# Patient Record
Sex: MALE | Race: OTHER | NOT HISPANIC OR LATINO | URBAN - METROPOLITAN AREA
[De-identification: names, ages, dates, MRNs, and addresses within clinical notes are randomized per-mention and may not be internally consistent; named-entity substitution may affect disease eponyms.]

---

## 2018-06-19 ENCOUNTER — OUTPATIENT (OUTPATIENT)
Dept: OUTPATIENT SERVICES | Facility: HOSPITAL | Age: 68
LOS: 1 days | End: 2018-06-19
Payer: MEDICARE

## 2018-06-19 VITALS
OXYGEN SATURATION: 96 % | TEMPERATURE: 98 F | DIASTOLIC BLOOD PRESSURE: 82 MMHG | HEIGHT: 65 IN | WEIGHT: 181.22 LBS | SYSTOLIC BLOOD PRESSURE: 144 MMHG | HEART RATE: 58 BPM | RESPIRATION RATE: 14 BRPM

## 2018-06-19 VITALS
OXYGEN SATURATION: 98 % | HEIGHT: 65 IN | DIASTOLIC BLOOD PRESSURE: 82 MMHG | RESPIRATION RATE: 15 BRPM | WEIGHT: 181.22 LBS | HEART RATE: 63 BPM | SYSTOLIC BLOOD PRESSURE: 147 MMHG | TEMPERATURE: 97 F

## 2018-06-19 DIAGNOSIS — N40.0 BENIGN PROSTATIC HYPERPLASIA WITHOUT LOWER URINARY TRACT SYMPTOMS: ICD-10-CM

## 2018-06-19 DIAGNOSIS — Z98.890 OTHER SPECIFIED POSTPROCEDURAL STATES: Chronic | ICD-10-CM

## 2018-06-19 DIAGNOSIS — Z87.19 PERSONAL HISTORY OF OTHER DISEASES OF THE DIGESTIVE SYSTEM: Chronic | ICD-10-CM

## 2018-06-19 DIAGNOSIS — Z92.89 PERSONAL HISTORY OF OTHER MEDICAL TREATMENT: Chronic | ICD-10-CM

## 2018-06-19 DIAGNOSIS — N40.1 BENIGN PROSTATIC HYPERPLASIA WITH LOWER URINARY TRACT SYMPTOMS: ICD-10-CM

## 2018-06-19 DIAGNOSIS — Z01.818 ENCOUNTER FOR OTHER PREPROCEDURAL EXAMINATION: ICD-10-CM

## 2018-06-19 LAB
APPEARANCE UR: CLEAR — SIGNIFICANT CHANGE UP
BACTERIA # UR AUTO: NEGATIVE /HPF — SIGNIFICANT CHANGE UP
BILIRUB UR-MCNC: NEGATIVE — SIGNIFICANT CHANGE UP
COLOR SPEC: YELLOW — SIGNIFICANT CHANGE UP
DIFF PNL FLD: ABNORMAL
EPI CELLS # UR: SIGNIFICANT CHANGE UP
GLUCOSE UR QL: NEGATIVE — SIGNIFICANT CHANGE UP
HCT VFR BLD CALC: 45.2 % — SIGNIFICANT CHANGE UP (ref 39–50)
HGB BLD-MCNC: 15.1 G/DL — SIGNIFICANT CHANGE UP (ref 13–17)
KETONES UR-MCNC: NEGATIVE — SIGNIFICANT CHANGE UP
LEUKOCYTE ESTERASE UR-ACNC: ABNORMAL
MCHC RBC-ENTMCNC: 28.6 PG — SIGNIFICANT CHANGE UP (ref 27–34)
MCHC RBC-ENTMCNC: 33.3 GM/DL — SIGNIFICANT CHANGE UP (ref 32–36)
MCV RBC AUTO: 85.7 FL — SIGNIFICANT CHANGE UP (ref 80–100)
NITRITE UR-MCNC: NEGATIVE — SIGNIFICANT CHANGE UP
PH UR: 6 — SIGNIFICANT CHANGE UP (ref 5–8)
PLATELET # BLD AUTO: 147 K/UL — LOW (ref 150–400)
PROT UR-MCNC: 15
RBC # BLD: 5.28 M/UL — SIGNIFICANT CHANGE UP (ref 4.2–5.8)
RBC # FLD: 14 % — SIGNIFICANT CHANGE UP (ref 10.3–14.5)
RBC CASTS # UR COMP ASSIST: SIGNIFICANT CHANGE UP /HPF (ref 0–4)
SP GR SPEC: 1.01 — SIGNIFICANT CHANGE UP (ref 1.01–1.02)
UROBILINOGEN FLD QL: NEGATIVE — SIGNIFICANT CHANGE UP
WBC # BLD: 5.9 K/UL — SIGNIFICANT CHANGE UP (ref 3.8–10.5)
WBC # FLD AUTO: 5.9 K/UL — SIGNIFICANT CHANGE UP (ref 3.8–10.5)
WBC UR QL: SIGNIFICANT CHANGE UP /HPF (ref 0–5)

## 2018-06-19 PROCEDURE — 36415 COLL VENOUS BLD VENIPUNCTURE: CPT

## 2018-06-19 PROCEDURE — 85027 COMPLETE CBC AUTOMATED: CPT

## 2018-06-19 PROCEDURE — 86901 BLOOD TYPING SEROLOGIC RH(D): CPT

## 2018-06-19 PROCEDURE — 87086 URINE CULTURE/COLONY COUNT: CPT

## 2018-06-19 PROCEDURE — 86900 BLOOD TYPING SEROLOGIC ABO: CPT

## 2018-06-19 PROCEDURE — 81001 URINALYSIS AUTO W/SCOPE: CPT

## 2018-06-19 PROCEDURE — 86850 RBC ANTIBODY SCREEN: CPT

## 2018-06-19 PROCEDURE — 93005 ELECTROCARDIOGRAM TRACING: CPT

## 2018-06-19 PROCEDURE — 93010 ELECTROCARDIOGRAM REPORT: CPT | Mod: NC

## 2018-06-19 PROCEDURE — G0463: CPT

## 2018-06-19 RX ORDER — SODIUM CHLORIDE 9 MG/ML
1000 INJECTION, SOLUTION INTRAVENOUS
Qty: 0 | Refills: 0 | Status: DISCONTINUED | OUTPATIENT
Start: 2018-06-19 | End: 2018-06-19

## 2018-06-19 RX ORDER — SODIUM CHLORIDE 9 MG/ML
1000 INJECTION, SOLUTION INTRAVENOUS
Qty: 0 | Refills: 0 | Status: DISCONTINUED | OUTPATIENT
Start: 2018-07-03 | End: 2018-07-03

## 2018-06-19 NOTE — H&P PST ADULT - FAMILY HISTORY
Father  Still living? No  Family history of diabetes mellitus, Age at diagnosis: Age Unknown  Family history of CHF (congestive heart failure), Age at diagnosis: Age Unknown     Mother  Still living? No  Family history of diabetes mellitus, Age at diagnosis: Age Unknown  Family history of breast cancer, Age at diagnosis: Age Unknown

## 2018-06-19 NOTE — ASU PATIENT PROFILE, ADULT - ABILITY TO HEAR (WITH HEARING AID OR HEARING APPLIANCE IF NORMALLY USED):
bilateral hearing aids-sounds muffled without them/Mildly to Moderately Impaired: difficulty hearing in some environments or speaker may need to increase volume or speak distinctly

## 2018-06-19 NOTE — H&P PST ADULT - PSH
H/O inguinal hernia  left groin, 14 years ago  H/O lithotripsy  15 years ago  History of removal of staples

## 2018-06-19 NOTE — H&P PST ADULT - PMH
Chronic UTI (urinary tract infection)    Dementia due to Parkinson's disease with behavioral disturbance    Dry eyes, bilateral    Kidney calculi  left kidney  Osteoarthritis of spine, unspecified spinal osteoarthritis complication status, unspecified spinal region    Parkinson disease    Prostatic hyperplasia, benign localized    Sleep apnea, unspecified type  no cpap  Traumatic injury of head, initial encounter  Evaluated in ED at [facility].  Received 8 staples to the head.

## 2018-06-19 NOTE — ASU PATIENT PROFILE, ADULT - VISION (WITH CORRECTIVE LENSES IF THE PATIENT USUALLY WEARS THEM):
progressive glasses/Partially impaired: cannot see medication labels or newsprint, but can see obstacles in path, and the surrounding layout; can count fingers at arm's length

## 2018-06-19 NOTE — H&P PST ADULT - PROBLEM SELECTOR PLAN 1
General and local anesthetic options, risks and benefits discussed.  Pre-op tests ordered per protocol.  He will see Dr Mejia for medical clearance on 6/27/18.

## 2018-06-19 NOTE — ASU PATIENT PROFILE, ADULT - PMH
Chronic UTI (urinary tract infection)    Dementia due to Parkinson's disease with behavioral disturbance    Dry eyes, bilateral    Kidney calculi  left kidney  Osteoarthritis of spine, unspecified spinal osteoarthritis complication status, unspecified spinal region    Parkinson disease    Prostatic hyperplasia, benign localized    Sleep apnea, unspecified type  no cpap  Traumatic injury of head, initial encounter  Evaluated in ED at [facility].  Received 8 staples to the head.

## 2018-06-20 LAB
CULTURE RESULTS: SIGNIFICANT CHANGE UP
SPECIMEN SOURCE: SIGNIFICANT CHANGE UP

## 2018-07-02 ENCOUNTER — TRANSCRIPTION ENCOUNTER (OUTPATIENT)
Age: 68
End: 2018-07-02

## 2018-07-03 ENCOUNTER — OUTPATIENT (OUTPATIENT)
Dept: OUTPATIENT SERVICES | Facility: HOSPITAL | Age: 68
LOS: 1 days | End: 2018-07-03
Payer: MEDICARE

## 2018-07-03 DIAGNOSIS — N40.1 BENIGN PROSTATIC HYPERPLASIA WITH LOWER URINARY TRACT SYMPTOMS: ICD-10-CM

## 2018-07-03 DIAGNOSIS — Z87.19 PERSONAL HISTORY OF OTHER DISEASES OF THE DIGESTIVE SYSTEM: Chronic | ICD-10-CM

## 2018-07-03 DIAGNOSIS — R31.0 GROSS HEMATURIA: ICD-10-CM

## 2018-07-03 DIAGNOSIS — Z98.890 OTHER SPECIFIED POSTPROCEDURAL STATES: Chronic | ICD-10-CM

## 2018-07-03 DIAGNOSIS — N39.0 URINARY TRACT INFECTION, SITE NOT SPECIFIED: ICD-10-CM

## 2018-07-03 DIAGNOSIS — N40.0 BENIGN PROSTATIC HYPERPLASIA WITHOUT LOWER URINARY TRACT SYMPTOMS: ICD-10-CM

## 2018-07-03 DIAGNOSIS — Z92.89 PERSONAL HISTORY OF OTHER MEDICAL TREATMENT: Chronic | ICD-10-CM

## 2018-07-03 LAB — ABO RH CONFIRMATION: SIGNIFICANT CHANGE UP

## 2018-07-03 RX ORDER — RASAGILINE 0.5 MG/1
1 TABLET ORAL DAILY
Qty: 0 | Refills: 0 | Status: DISCONTINUED | OUTPATIENT
Start: 2018-07-04 | End: 2018-07-18

## 2018-07-03 RX ORDER — CARBIDOPA AND LEVODOPA 25; 100 MG/1; MG/1
2 TABLET ORAL
Qty: 0 | Refills: 0 | Status: DISCONTINUED | OUTPATIENT
Start: 2018-07-03 | End: 2018-07-18

## 2018-07-03 RX ORDER — SODIUM CHLORIDE 9 MG/ML
1000 INJECTION, SOLUTION INTRAVENOUS
Qty: 0 | Refills: 0 | Status: DISCONTINUED | OUTPATIENT
Start: 2018-07-03 | End: 2018-07-18

## 2018-07-03 RX ORDER — OXYCODONE AND ACETAMINOPHEN 5; 325 MG/1; MG/1
1 TABLET ORAL EVERY 4 HOURS
Qty: 0 | Refills: 0 | Status: DISCONTINUED | OUTPATIENT
Start: 2018-07-03 | End: 2018-07-03

## 2018-07-03 RX ORDER — HYDROMORPHONE HYDROCHLORIDE 2 MG/ML
0.5 INJECTION INTRAMUSCULAR; INTRAVENOUS; SUBCUTANEOUS
Qty: 0 | Refills: 0 | Status: DISCONTINUED | OUTPATIENT
Start: 2018-07-03 | End: 2018-07-03

## 2018-07-03 RX ORDER — SERTRALINE 25 MG/1
50 TABLET, FILM COATED ORAL DAILY
Qty: 0 | Refills: 0 | Status: DISCONTINUED | OUTPATIENT
Start: 2018-07-03 | End: 2018-07-18

## 2018-07-03 RX ORDER — SODIUM CHLORIDE 9 MG/ML
1000 INJECTION, SOLUTION INTRAVENOUS
Qty: 0 | Refills: 0 | Status: DISCONTINUED | OUTPATIENT
Start: 2018-07-03 | End: 2018-07-03

## 2018-07-03 RX ADMIN — OXYCODONE AND ACETAMINOPHEN 1 TABLET(S): 5; 325 TABLET ORAL at 22:45

## 2018-07-03 RX ADMIN — SODIUM CHLORIDE 50 MILLILITER(S): 9 INJECTION, SOLUTION INTRAVENOUS at 07:35

## 2018-07-03 RX ADMIN — OXYCODONE AND ACETAMINOPHEN 1 TABLET(S): 5; 325 TABLET ORAL at 23:22

## 2018-07-03 RX ADMIN — SODIUM CHLORIDE 100 MILLILITER(S): 9 INJECTION, SOLUTION INTRAVENOUS at 14:16

## 2018-07-03 RX ADMIN — CARBIDOPA AND LEVODOPA 2 TABLET(S): 25; 100 TABLET ORAL at 21:28

## 2018-07-03 NOTE — BRIEF OPERATIVE NOTE - PROCEDURE
<<-----Click on this checkbox to enter Procedure JUNE, using button electrode  07/03/2018    Active  EHOCHBER

## 2018-07-04 VITALS
HEART RATE: 49 BPM | TEMPERATURE: 98 F | DIASTOLIC BLOOD PRESSURE: 61 MMHG | SYSTOLIC BLOOD PRESSURE: 107 MMHG | OXYGEN SATURATION: 96 % | RESPIRATION RATE: 14 BRPM | WEIGHT: 191.58 LBS

## 2018-07-04 PROCEDURE — 52601 PROSTATECTOMY (TURP): CPT

## 2018-07-04 RX ADMIN — CARBIDOPA AND LEVODOPA 2 TABLET(S): 25; 100 TABLET ORAL at 00:17

## 2018-07-04 RX ADMIN — SODIUM CHLORIDE 100 MILLILITER(S): 9 INJECTION, SOLUTION INTRAVENOUS at 05:17

## 2018-07-04 RX ADMIN — CARBIDOPA AND LEVODOPA 2 TABLET(S): 25; 100 TABLET ORAL at 05:16

## 2018-07-04 NOTE — PROGRESS NOTE ADULT - SUBJECTIVE AND OBJECTIVE BOX
Patient is a 67y old  Male who presents with a chief complaint of  BPH  HPI: underwent prostate plasma button yesterday    Interval Events:  Patient seen and examined at bedside.    MEDICATIONS:  MEDICATIONS  (STANDING):  carbidopa/levodopa  25/100 2 Tablet(s) Oral four times a day  dextrose 5% + sodium chloride 0.45%. 1000 milliLiter(s) (100 mL/Hr) IV Continuous <Continuous>  rasagiline Tablet 1 milliGRAM(s) Oral daily  sertraline 50 milliGRAM(s) Oral daily    MEDICATIONS  (PRN):  oxyCODONE    5 mG/acetaminophen 325 mG 1 Tablet(s) Oral every 4 hours PRN Severe Pain (7 - 10)      Allergies    iodine containing compounds (Hives; Swelling; Joint Pain)  sulfa drugs (Swelling)    Intolerances        Vital Signs Last 24 Hrs  T(C): 36.5 (04 Jul 2018 05:00), Max: 36.8 (03 Jul 2018 17:20)  T(F): 97.7 (04 Jul 2018 05:00), Max: 98.2 (03 Jul 2018 17:20)  HR: 49 (04 Jul 2018 05:00) (49 - 66)  BP: 107/61 (04 Jul 2018 05:00) (107/61 - 187/77)  BP(mean): --  RR: 14 (04 Jul 2018 05:00) (14 - 16)  SpO2: 96% (04 Jul 2018 05:00) (96% - 98%)      I&O's Detail    03 Jul 2018 07:01  -  04 Jul 2018 07:00  --------------------------------------------------------  IN:    dextrose 5% + sodium chloride 0.45%.: 1200 mL    Instillation: 100 mL    lactated ringers.: 700 mL    Oral Fluid: 1600 mL  Total IN: 3600 mL    OUT:    Indwelling Catheter - Urethral: 2935 mL  Total OUT: 2935 mL    Total NET: 665 mL      04 Jul 2018 07:01  -  04 Jul 2018 11:17  --------------------------------------------------------  IN:    Oral Fluid: 320 mL  Total IN: 320 mL    OUT:  Total OUT: 0 mL    Total NET: 320 mL              LABS:                            Physical Exam    Constitutional: alert NAD    Abdomen: soft, NT/ND    Genitourinary: oglesby light pink, testes benign
Patient is a 67y old  Male who presents with a chief complaint of BPH and recurrent UTIs  HPI: admitted for plasma button underwent earlier today. had hematuria and oglesby cahnged. few clots. new catheter placed. now draining well. no clots. irrigates freely    Interval Events:  Patient seen and examined at bedside.    MEDICATIONS:  MEDICATIONS  (STANDING):  lactated ringers. 1000 milliLiter(s) (100 mL/Hr) IV Continuous <Continuous>    MEDICATIONS  (PRN):  HYDROmorphone  Injectable 0.5 milliGRAM(s) IV Push every 10 minutes PRN Moderate Pain  oxyCODONE    5 mG/acetaminophen 325 mG 1 Tablet(s) Oral every 4 hours PRN Moderate Pain      Allergies    iodine containing compounds (Hives; Swelling; Joint Pain)  sulfa drugs (Swelling)    Intolerances        Vital Signs Last 24 Hrs  T(C): 36.6 (03 Jul 2018 11:50), Max: 36.6 (03 Jul 2018 09:54)  T(F): 97.8 (03 Jul 2018 11:50), Max: 97.9 (03 Jul 2018 09:54)  HR: 60 (03 Jul 2018 11:50) (55 - 61)  BP: 187/77 (03 Jul 2018 11:50) (125/68 - 187/77)  BP(mean): --  RR: 16 (03 Jul 2018 11:50) (16 - 18)  SpO2: 98% (03 Jul 2018 11:50) (97% - 100%)      I&O's Detail    03 Jul 2018 07:01  -  03 Jul 2018 16:27  --------------------------------------------------------  IN:    Instillation: 100 mL    lactated ringers.: 400 mL    Oral Fluid: 730 mL  Total IN: 1230 mL    OUT:    Indwelling Catheter - Urethral: 1125 mL  Total OUT: 1125 mL    Total NET: 105 mL              LABS:                            Physical Exam    Constitutional: alert NAD    Abdomen: soft, benign NO HSM, no bladder distention    Genitourinary: penis OK, testes benign, oglesby draining light pink urine

## 2019-09-22 ENCOUNTER — INPATIENT (INPATIENT)
Facility: HOSPITAL | Age: 69
LOS: 7 days | Discharge: HOSPICE | DRG: 698 | End: 2019-09-30
Attending: HOSPITALIST | Admitting: INTERNAL MEDICINE
Payer: MEDICARE

## 2019-09-22 VITALS
TEMPERATURE: 99 F | DIASTOLIC BLOOD PRESSURE: 79 MMHG | SYSTOLIC BLOOD PRESSURE: 145 MMHG | OXYGEN SATURATION: 99 % | WEIGHT: 156.09 LBS | HEIGHT: 67 IN | RESPIRATION RATE: 18 BRPM | HEART RATE: 74 BPM

## 2019-09-22 DIAGNOSIS — M10.9 GOUT, UNSPECIFIED: ICD-10-CM

## 2019-09-22 DIAGNOSIS — Z87.19 PERSONAL HISTORY OF OTHER DISEASES OF THE DIGESTIVE SYSTEM: Chronic | ICD-10-CM

## 2019-09-22 DIAGNOSIS — L89.152 PRESSURE ULCER OF SACRAL REGION, STAGE 2: ICD-10-CM

## 2019-09-22 DIAGNOSIS — Z98.890 OTHER SPECIFIED POSTPROCEDURAL STATES: Chronic | ICD-10-CM

## 2019-09-22 DIAGNOSIS — G20 PARKINSON'S DISEASE: ICD-10-CM

## 2019-09-22 DIAGNOSIS — T83.511A INFECTION AND INFLAMMATORY REACTION DUE TO INDWELLING URETHRAL CATHETER, INITIAL ENCOUNTER: ICD-10-CM

## 2019-09-22 DIAGNOSIS — N39.0 URINARY TRACT INFECTION, SITE NOT SPECIFIED: ICD-10-CM

## 2019-09-22 DIAGNOSIS — Z00.00 ENCOUNTER FOR GENERAL ADULT MEDICAL EXAMINATION WITHOUT ABNORMAL FINDINGS: ICD-10-CM

## 2019-09-22 DIAGNOSIS — E87.2 ACIDOSIS: ICD-10-CM

## 2019-09-22 DIAGNOSIS — Z92.89 PERSONAL HISTORY OF OTHER MEDICAL TREATMENT: Chronic | ICD-10-CM

## 2019-09-22 DIAGNOSIS — R53.1 WEAKNESS: ICD-10-CM

## 2019-09-22 DIAGNOSIS — R33.9 RETENTION OF URINE, UNSPECIFIED: ICD-10-CM

## 2019-09-22 PROBLEM — G47.30 SLEEP APNEA, UNSPECIFIED: Chronic | Status: ACTIVE | Noted: 2018-06-19

## 2019-09-22 PROBLEM — N20.0 CALCULUS OF KIDNEY: Chronic | Status: ACTIVE | Noted: 2018-06-19

## 2019-09-22 PROBLEM — M47.9 SPONDYLOSIS, UNSPECIFIED: Chronic | Status: ACTIVE | Noted: 2018-06-19

## 2019-09-22 PROBLEM — H04.123 DRY EYE SYNDROME OF BILATERAL LACRIMAL GLANDS: Chronic | Status: ACTIVE | Noted: 2018-06-19

## 2019-09-22 PROBLEM — N40.0 BENIGN PROSTATIC HYPERPLASIA WITHOUT LOWER URINARY TRACT SYMPTOMS: Chronic | Status: ACTIVE | Noted: 2018-06-19

## 2019-09-22 PROBLEM — S09.90XA UNSPECIFIED INJURY OF HEAD, INITIAL ENCOUNTER: Chronic | Status: ACTIVE | Noted: 2018-06-19

## 2019-09-22 LAB
ALBUMIN SERPL ELPH-MCNC: 3 G/DL — LOW (ref 3.3–5)
ALP SERPL-CCNC: 76 U/L — SIGNIFICANT CHANGE UP (ref 40–120)
ALT FLD-CCNC: 29 U/L — SIGNIFICANT CHANGE UP (ref 10–45)
ANION GAP SERPL CALC-SCNC: 9 MMOL/L — SIGNIFICANT CHANGE UP (ref 5–17)
APPEARANCE UR: CLEAR — SIGNIFICANT CHANGE UP
APTT BLD: 28.7 SEC — SIGNIFICANT CHANGE UP (ref 27.5–36.3)
AST SERPL-CCNC: 52 U/L — HIGH (ref 10–40)
BASOPHILS # BLD AUTO: 0.03 K/UL — SIGNIFICANT CHANGE UP (ref 0–0.2)
BASOPHILS NFR BLD AUTO: 0.3 % — SIGNIFICANT CHANGE UP (ref 0–2)
BILIRUB SERPL-MCNC: 0.4 MG/DL — SIGNIFICANT CHANGE UP (ref 0.2–1.2)
BILIRUB UR-MCNC: NEGATIVE — SIGNIFICANT CHANGE UP
BUN SERPL-MCNC: 22 MG/DL — SIGNIFICANT CHANGE UP (ref 7–23)
CALCIUM SERPL-MCNC: 9.1 MG/DL — SIGNIFICANT CHANGE UP (ref 8.4–10.5)
CHLORIDE SERPL-SCNC: 106 MMOL/L — SIGNIFICANT CHANGE UP (ref 96–108)
CO2 SERPL-SCNC: 28 MMOL/L — SIGNIFICANT CHANGE UP (ref 22–31)
COLOR SPEC: YELLOW — SIGNIFICANT CHANGE UP
CREAT SERPL-MCNC: 0.97 MG/DL — SIGNIFICANT CHANGE UP (ref 0.5–1.3)
DIFF PNL FLD: ABNORMAL
EOSINOPHIL # BLD AUTO: 0.01 K/UL — SIGNIFICANT CHANGE UP (ref 0–0.5)
EOSINOPHIL NFR BLD AUTO: 0.1 % — SIGNIFICANT CHANGE UP (ref 0–6)
GLUCOSE SERPL-MCNC: 182 MG/DL — HIGH (ref 70–99)
GLUCOSE UR QL: NEGATIVE — SIGNIFICANT CHANGE UP
HCT VFR BLD CALC: 43.3 % — SIGNIFICANT CHANGE UP (ref 39–50)
HGB BLD-MCNC: 13.8 G/DL — SIGNIFICANT CHANGE UP (ref 13–17)
IMM GRANULOCYTES NFR BLD AUTO: 0.9 % — SIGNIFICANT CHANGE UP (ref 0–1.5)
INR BLD: 1.14 RATIO — SIGNIFICANT CHANGE UP (ref 0.88–1.16)
KETONES UR-MCNC: NEGATIVE — SIGNIFICANT CHANGE UP
LACTATE SERPL-SCNC: 1.8 MMOL/L — SIGNIFICANT CHANGE UP (ref 0.7–2)
LACTATE SERPL-SCNC: 2.8 MMOL/L — HIGH (ref 0.7–2)
LEUKOCYTE ESTERASE UR-ACNC: ABNORMAL
LYMPHOCYTES # BLD AUTO: 0.52 K/UL — LOW (ref 1–3.3)
LYMPHOCYTES # BLD AUTO: 5 % — LOW (ref 13–44)
MCHC RBC-ENTMCNC: 28.3 PG — SIGNIFICANT CHANGE UP (ref 27–34)
MCHC RBC-ENTMCNC: 31.9 GM/DL — LOW (ref 32–36)
MCV RBC AUTO: 88.7 FL — SIGNIFICANT CHANGE UP (ref 80–100)
MONOCYTES # BLD AUTO: 0.17 K/UL — SIGNIFICANT CHANGE UP (ref 0–0.9)
MONOCYTES NFR BLD AUTO: 1.6 % — LOW (ref 2–14)
NEUTROPHILS # BLD AUTO: 9.49 K/UL — HIGH (ref 1.8–7.4)
NEUTROPHILS NFR BLD AUTO: 92.1 % — HIGH (ref 43–77)
NITRITE UR-MCNC: POSITIVE
NRBC # BLD: 0 /100 WBCS — SIGNIFICANT CHANGE UP (ref 0–0)
PH UR: 8 — SIGNIFICANT CHANGE UP (ref 5–8)
PLATELET # BLD AUTO: 208 K/UL — SIGNIFICANT CHANGE UP (ref 150–400)
POTASSIUM SERPL-MCNC: 4.1 MMOL/L — SIGNIFICANT CHANGE UP (ref 3.5–5.3)
POTASSIUM SERPL-SCNC: 4.1 MMOL/L — SIGNIFICANT CHANGE UP (ref 3.5–5.3)
PROT SERPL-MCNC: 7.6 G/DL — SIGNIFICANT CHANGE UP (ref 6–8.3)
PROT UR-MCNC: 100
PROTHROM AB SERPL-ACNC: 12.8 SEC — SIGNIFICANT CHANGE UP (ref 10–12.9)
RBC # BLD: 4.88 M/UL — SIGNIFICANT CHANGE UP (ref 4.2–5.8)
RBC # FLD: 13.2 % — SIGNIFICANT CHANGE UP (ref 10.3–14.5)
SODIUM SERPL-SCNC: 143 MMOL/L — SIGNIFICANT CHANGE UP (ref 135–145)
SP GR SPEC: 1.01 — SIGNIFICANT CHANGE UP (ref 1.01–1.02)
UROBILINOGEN FLD QL: NEGATIVE — SIGNIFICANT CHANGE UP
WBC # BLD: 10.31 K/UL — SIGNIFICANT CHANGE UP (ref 3.8–10.5)
WBC # FLD AUTO: 10.31 K/UL — SIGNIFICANT CHANGE UP (ref 3.8–10.5)

## 2019-09-22 PROCEDURE — 99285 EMERGENCY DEPT VISIT HI MDM: CPT

## 2019-09-22 PROCEDURE — 99223 1ST HOSP IP/OBS HIGH 75: CPT

## 2019-09-22 PROCEDURE — 93010 ELECTROCARDIOGRAM REPORT: CPT

## 2019-09-22 PROCEDURE — 71045 X-RAY EXAM CHEST 1 VIEW: CPT | Mod: 26

## 2019-09-22 RX ORDER — TETRAHYDROZOLINE/POLYETHYL GLY 0.05 %-1 %
1 DROPS OPHTHALMIC (EYE)
Qty: 0 | Refills: 0 | DISCHARGE

## 2019-09-22 RX ORDER — CARBIDOPA AND LEVODOPA 25; 100 MG/1; MG/1
2 TABLET ORAL
Qty: 0 | Refills: 0 | DISCHARGE

## 2019-09-22 RX ORDER — FINASTERIDE 5 MG/1
1 TABLET, FILM COATED ORAL
Qty: 0 | Refills: 0 | DISCHARGE

## 2019-09-22 RX ORDER — GALANTAMINE HYDROBROMIDE 4 MG/1
1 TABLET, FILM COATED ORAL
Qty: 0 | Refills: 0 | DISCHARGE

## 2019-09-22 RX ORDER — ENOXAPARIN SODIUM 100 MG/ML
40 INJECTION SUBCUTANEOUS DAILY
Refills: 0 | Status: DISCONTINUED | OUTPATIENT
Start: 2019-09-22 | End: 2019-09-30

## 2019-09-22 RX ORDER — CARBIDOPA AND LEVODOPA 25; 100 MG/1; MG/1
1 TABLET ORAL
Qty: 0 | Refills: 0 | DISCHARGE

## 2019-09-22 RX ORDER — ALLOPURINOL 300 MG
100 TABLET ORAL DAILY
Refills: 0 | Status: DISCONTINUED | OUTPATIENT
Start: 2019-09-22 | End: 2019-09-30

## 2019-09-22 RX ORDER — RIVASTIGMINE 4.6 MG/24H
1 PATCH, EXTENDED RELEASE TRANSDERMAL
Refills: 0 | Status: DISCONTINUED | OUTPATIENT
Start: 2019-09-23 | End: 2019-09-30

## 2019-09-22 RX ORDER — CEFTRIAXONE 500 MG/1
1000 INJECTION, POWDER, FOR SOLUTION INTRAMUSCULAR; INTRAVENOUS ONCE
Refills: 0 | Status: COMPLETED | OUTPATIENT
Start: 2019-09-22 | End: 2019-09-22

## 2019-09-22 RX ORDER — CARBIDOPA AND LEVODOPA 25; 100 MG/1; MG/1
1 TABLET ORAL
Refills: 0 | Status: DISCONTINUED | OUTPATIENT
Start: 2019-09-22 | End: 2019-09-30

## 2019-09-22 RX ORDER — ACETAMINOPHEN 500 MG
650 TABLET ORAL EVERY 6 HOURS
Refills: 0 | Status: DISCONTINUED | OUTPATIENT
Start: 2019-09-22 | End: 2019-09-30

## 2019-09-22 RX ORDER — SERTRALINE 25 MG/1
50 TABLET, FILM COATED ORAL DAILY
Refills: 0 | Status: DISCONTINUED | OUTPATIENT
Start: 2019-09-22 | End: 2019-09-30

## 2019-09-22 RX ORDER — ROTIGOTINE 8 MG/24H
1 PATCH, EXTENDED RELEASE TRANSDERMAL
Qty: 0 | Refills: 0 | DISCHARGE

## 2019-09-22 RX ORDER — SODIUM CHLORIDE 9 MG/ML
2000 INJECTION INTRAMUSCULAR; INTRAVENOUS; SUBCUTANEOUS ONCE
Refills: 0 | Status: COMPLETED | OUTPATIENT
Start: 2019-09-22 | End: 2019-09-22

## 2019-09-22 RX ORDER — SERTRALINE 25 MG/1
1 TABLET, FILM COATED ORAL
Qty: 0 | Refills: 0 | DISCHARGE

## 2019-09-22 RX ORDER — RIVASTIGMINE 4.6 MG/24H
1 PATCH, EXTENDED RELEASE TRANSDERMAL
Qty: 0 | Refills: 0 | DISCHARGE

## 2019-09-22 RX ORDER — FINASTERIDE 5 MG/1
5 TABLET, FILM COATED ORAL DAILY
Refills: 0 | Status: DISCONTINUED | OUTPATIENT
Start: 2019-09-22 | End: 2019-09-30

## 2019-09-22 RX ORDER — RASAGILINE 0.5 MG/1
1 TABLET ORAL
Qty: 0 | Refills: 0 | DISCHARGE

## 2019-09-22 RX ORDER — COLLAGENASE CLOSTRIDIUM HIST. 250 UNIT/G
1 OINTMENT (GRAM) TOPICAL
Refills: 0 | Status: DISCONTINUED | OUTPATIENT
Start: 2019-09-22 | End: 2019-09-30

## 2019-09-22 RX ORDER — QUETIAPINE FUMARATE 200 MG/1
1 TABLET, FILM COATED ORAL
Qty: 0 | Refills: 0 | DISCHARGE

## 2019-09-22 RX ORDER — UBIDECARENONE 100 MG
1 CAPSULE ORAL
Qty: 0 | Refills: 0 | DISCHARGE

## 2019-09-22 RX ORDER — CEFTRIAXONE 500 MG/1
1000 INJECTION, POWDER, FOR SOLUTION INTRAMUSCULAR; INTRAVENOUS EVERY 24 HOURS
Refills: 0 | Status: DISCONTINUED | OUTPATIENT
Start: 2019-09-22 | End: 2019-09-23

## 2019-09-22 RX ORDER — QUETIAPINE FUMARATE 200 MG/1
25 TABLET, FILM COATED ORAL AT BEDTIME
Refills: 0 | Status: DISCONTINUED | OUTPATIENT
Start: 2019-09-22 | End: 2019-09-24

## 2019-09-22 RX ORDER — FAMOTIDINE 10 MG/ML
20 INJECTION INTRAVENOUS
Qty: 0 | Refills: 0 | DISCHARGE

## 2019-09-22 RX ADMIN — Medication 1 APPLICATION(S): at 17:25

## 2019-09-22 RX ADMIN — CARBIDOPA AND LEVODOPA 1 TABLET(S): 25; 100 TABLET ORAL at 21:11

## 2019-09-22 RX ADMIN — QUETIAPINE FUMARATE 25 MILLIGRAM(S): 200 TABLET, FILM COATED ORAL at 21:11

## 2019-09-22 RX ADMIN — Medication 100 MILLIGRAM(S): at 17:24

## 2019-09-22 RX ADMIN — SODIUM CHLORIDE 2000 MILLILITER(S): 9 INJECTION INTRAMUSCULAR; INTRAVENOUS; SUBCUTANEOUS at 13:51

## 2019-09-22 RX ADMIN — Medication 1 DROP(S): at 17:24

## 2019-09-22 RX ADMIN — CEFTRIAXONE 100 MILLIGRAM(S): 500 INJECTION, POWDER, FOR SOLUTION INTRAMUSCULAR; INTRAVENOUS at 13:51

## 2019-09-22 RX ADMIN — SODIUM CHLORIDE 2000 MILLILITER(S): 9 INJECTION INTRAMUSCULAR; INTRAVENOUS; SUBCUTANEOUS at 14:30

## 2019-09-22 RX ADMIN — CEFTRIAXONE 1000 MILLIGRAM(S): 500 INJECTION, POWDER, FOR SOLUTION INTRAMUSCULAR; INTRAVENOUS at 14:20

## 2019-09-22 NOTE — H&P ADULT - NSHPOUTPATIENTPROVIDERS_GEN_ALL_CORE
PCP: Dr. Orona  Neurology: Dr. Lundberg PCP: Dr. Orona  Neurology: Dr. Lundberg  Urology: Dr. Brooke

## 2019-09-22 NOTE — H&P ADULT - NSHPPHYSICALEXAM_GEN_ALL_CORE
GENERAL: NAD  HEAD:  Atraumatic, Normocephalic  EYES: EOMI, PERRLA, conjunctiva and sclera clear  NECK: Supple  CHEST/LUNG: Clear to auscultation bilaterally; No wheeze  HEART: Regular rate and rhythm; No murmurs, rubs, or gallops  ABDOMEN: Soft, Nontender, Nondistended; Bowel sounds present  EXTREMITIES:  no edema. no left knee swelling  NEUROLOGY: non-focal

## 2019-09-22 NOTE — H&P ADULT - NSICDXFAMILYHX_GEN_ALL_CORE_FT
FAMILY HISTORY:  Family history of breast cancer  Family history of CHF (congestive heart failure)  Family history of diabetes mellitus

## 2019-09-22 NOTE — H&P ADULT - HISTORY OF PRESENT ILLNESS
69M h/o Parkinson's dementia, BPH, s/p TURB, h/o gout BIBEMS from home for 2 days of generalized weakness associated with chills and foul smelling cloudy urine in oglesby. 1 week ago pt was admitted to [facility] for UTI and oglesby was started for urinary retention. Since discharge from [place], pt has worsening generalized weakness, decreased po intake and now unable to ambulate with walker due to weakness. Wife of pt also mentioned that pt developed a bed sore. No fevers. + chills. No chest pain or sob. No nausea or vomiting, abdominal pain, or dysuria. Pt's wife states that pt had 3-4 episodes of UTI this past two years.     Afebrile in ED, VS stable. Lactic acid of 2.8.  Oglesby changed in ED.  UA positive, s/p rocephin 1g X1. 69M h/o Parkinson's dementia, BPH, h/o gout BIBEMS from home for 2 days of generalized weakness associated with chills and foul smelling cloudy urine in oglesby. 1 week ago pt was admitted to [facility] for UTI and oglesby was started for urinary retention. Since discharge from [place], pt has worsening generalized weakness, decreased po intake and now unable to ambulate with walker due to weakness. Wife of pt also mentioned that pt developed a bed sore. No fevers. + chills. No chest pain or sob. No nausea or vomiting, abdominal pain, or dysuria. Pt's wife states that pt had 3-4 episodes of UTI this past two years.     Afebrile in ED, VS stable. Lactic acid of 2.8.  Oglesby changed in ED.  UA positive, s/p rocephin 1g X1.

## 2019-09-22 NOTE — H&P ADULT - PROBLEM SELECTOR PLAN 5
Found to have recurrent gout in [facility].  Last dose of tapering steroid on 9/23  start Allopurinol 100mg daily

## 2019-09-22 NOTE — ED ADULT NURSE NOTE - OBJECTIVE STATEMENT
Pt presents to ED BIB EMS from home for c/o generalized weakness, thick & malodorous urine. Pt was recently discharged from [place] on 9/18 for UTI, as per wife pt was not discharged with antibiotics. Pt came in with oglesby, urine is cloudy with sediments & has foul smell. Pt denies pain, nausea or vomiting.

## 2019-09-22 NOTE — ED PROVIDER NOTE - OBJECTIVE STATEMENT
Dr. Mosher: 69M h/o Parkinson's dementia, BIBEMS from home with wife for 2 days of generalized weakness, foul smelling cloudy urine in oglesby and inability to ambulate with walker. 1 week ago pt was admitted to [facility] for UTI. Wife states he has been dehydrated since and has developed a bed sore since. No fevers or chills. No chest pain or sob. No nausea or vomiting. No abdo pain.

## 2019-09-22 NOTE — ED PROVIDER NOTE - ATTENDING CONTRIBUTION TO CARE
Dr. Mosher: I performed a face to face bedside interview with patient regarding history of present illness, review of symptoms and past medical history. I completed an independent physical exam.  I have discussed patient's plan of care with PA.   I agree with note as stated above, having amended the EMR as needed to reflect my findings.   This includes HISTORY OF PRESENT ILLNESS, HIV, PAST MEDICAL/SURGICAL/FAMILY/SOCIAL HISTORY, ALLERGIES AND HOME MEDICATIONS, REVIEW OF SYSTEMS, PHYSICAL EXAM, and any PROGRESS NOTES during the time I functioned as the attending physician for this patient.    Dr. Mosher: This H&P has been written by myself in its entirety

## 2019-09-22 NOTE — ED ADULT TRIAGE NOTE - CHIEF COMPLAINT QUOTE
Pt BIB EMS from home with c/o thick urine with foul odor from indwelling oglesby catheter. Pt was discharged from [facility] on September 18th for UTI. Per aid pt is weaker than normal.

## 2019-09-22 NOTE — H&P ADULT - NSICDXPASTMEDICALHX_GEN_ALL_CORE_FT
PAST MEDICAL HISTORY:  Chronic UTI (urinary tract infection)     Dementia due to Parkinson's disease with behavioral disturbance     Dry eyes, bilateral     Gout     Kidney calculi left kidney    Osteoarthritis of spine, unspecified spinal osteoarthritis complication status, unspecified spinal region     Parkinson disease     Prostatic hyperplasia, benign localized     Sleep apnea, unspecified type no cpap    Traumatic injury of head, initial encounter Evaluated in ED at [facility].  Received 8 staples to the head.

## 2019-09-22 NOTE — H&P ADULT - NSICDXPASTSURGICALHX_GEN_ALL_CORE_FT
PAST SURGICAL HISTORY:  H/O inguinal hernia left groin, 14 years ago    H/O lithotripsy 15 years ago    History of removal of staples

## 2019-09-22 NOTE — ED PROVIDER NOTE - CARE PLAN
Principal Discharge DX:	Urinary tract infection associated with indwelling urethral catheter, initial encounter Principal Discharge DX:	Urinary tract infection associated with indwelling urethral catheter, initial encounter  Secondary Diagnosis:	Dehydration

## 2019-09-22 NOTE — ED PROVIDER NOTE - CLINICAL SUMMARY MEDICAL DECISION MAKING FREE TEXT BOX
Pt with UTI with oglesby x 1 week, appears extremely dehydrated and deconditioned. Will r/o sepsis, give IVF as pt is very dehydrated, abx for UTI, admission for abx, IVF and PT.

## 2019-09-22 NOTE — ED ADULT NURSE NOTE - NSIMPLEMENTINTERV_GEN_ALL_ED
Implemented All Fall Risk Interventions:  [place] to call system. Call bell, personal items and telephone within reach. Instruct patient to call for assistance. Room bathroom lighting operational. Non-slip footwear when patient is off stretcher. Physically safe environment: no spills, clutter or unnecessary equipment. Stretcher in lowest position, wheels locked, appropriate side rails in place. Provide visual cue, wrist band, yellow gown, etc. Monitor gait and stability. Monitor for mental status changes and reorient to person, place, and time. Review medications for side effects contributing to fall risk. Reinforce activity limits and safety measures with patient and family.

## 2019-09-22 NOTE — H&P ADULT - ASSESSMENT
IMPROVE VTE Individual Risk Assessment    RISK                                                                Points    [  ] Previous VTE                                                  3    [  ] Thrombophilia                                               2    [  ] Lower limb paralysis                                      2        (unable to hold up >15 seconds)      [  ] Current Cancer                                              2         (within 6 months)    [  ] Immobilization > 24 hrs                                1    [  ] ICU/CCU stay > 24 hours                              1    [x  ] Age > 60                                                      1    IMPROVE VTE Score ____1___    IMPROVE Score 0-1: Low Risk, No VTE prophylaxis required for most patients, encourage ambulation.   IMPROVE Score 2-3: At risk, pharmacologic VTE prophylaxis is indicated for most patients (in the absence of a contraindication)  IMPROVE Score > or = 4: High Risk, pharmacologic VTE prophylaxis is indicated for most patients (in the absence of a contraindication)

## 2019-09-22 NOTE — ED PROVIDER NOTE - PROGRESS NOTE DETAILS
JUANCARLOS Harman: I seen and examined pt with Dr. Mosher. Agree with above HPI, PE, A/P. Will continue to monitor.

## 2019-09-22 NOTE — ED ADULT NURSE NOTE - CHIEF COMPLAINT QUOTE
Pt BIB EMS from home with c/o thick urine with foul odor from indwelling oglesby catheter. Pt was discharged from [facility] on September 18th for UTI. Per aid pt is weaker than normal.

## 2019-09-22 NOTE — GOALS OF CARE CONVERSATION - PERSONAL ADVANCE DIRECTIVE - CONVERSATION DETAILS
I had a detailed conversation with the wife regarding pt's GOC. Wife is his HCP as per Living Will. So far pt has been full code but after full discussion regarding pt's quality of life given advanced dementia, wife signed MOLST making him DNR/DNI but would want hospitalization for IVF and abx.

## 2019-09-22 NOTE — ED PROVIDER NOTE - PMH
Chronic UTI (urinary tract infection)    Dementia due to Parkinson's disease with behavioral disturbance    Dry eyes, bilateral    Kidney calculi  left kidney  Osteoarthritis of spine, unspecified spinal osteoarthritis complication status, unspecified spinal region    Parkinson disease    Prostatic hyperplasia, benign localized    Sleep apnea, unspecified type  no cpap  Traumatic injury of head, initial encounter  Evaluated in ED at [facility].  Received 8 staples to the head.

## 2019-09-22 NOTE — H&P ADULT - NSHPLABSRESULTS_GEN_ALL_CORE
T(C): 37.1 (19 @ 21:09), Max: 37.3 (19 @ 13:20)  T(F): 98.8 (19 @ 21:09), Max: 99.1 (19 @ 13:20)  HR: 60 (19 @ 21:09) (58 - 74)  BP: 111/76 (19 @ 21:09) (111/76 - 169/80)  RR: 18 (19 @ 21:09) (15 - 20)  SpO2: 100% (19 @ 21:09) (98% - 100%)  Labs:                         13.8   10.31   )-----------(   208      ( 22 Sep 2019 13:20 )              43.3     Neutro%  92.1<H>   Lympho%  5.0<L>   Mono%    1.6<L>   Bands    x            143  |  106  |  22  ----------------------------<  182<H>  4.1   |  28  |  0.97    Ca    9.1      22 Sep 2019 13:20    TPro  7.6  /  Alb  3.0<L>  /  TBili  0.4  /  DBili  x   /  AST  52<H>  /  ALT  29  /  AlkPhos  76      eGFR if : 91 mL/min/1.73M2 (19 @ 13:20)  eGFR if Non African American: 78 mL/min/1.73M2 (19 @ 13:20)      ( 22 Sep 2019 13:20 )   PT: 12.8 sec;   INR: 1.14 ratio;       PTT:28.7 sec        Urinalysis Basic - ( 22 Sep 2019 13:55 )    Color: Yellow / Appearance: Clear / S.015 / pH: x  Gluc: x / Ketone: Negative  / Bili: Negative / Urobili: Negative   Blood: x / Protein: 100 / Nitrite: Positive   Leuk Esterase: Moderate / RBC: >50 /HPF / WBC >50 /HPF   Sq Epi: x / Non Sq Epi: Neg.-Few / Bacteria: Many /HPF

## 2019-09-23 LAB
ANION GAP SERPL CALC-SCNC: 7 MMOL/L — SIGNIFICANT CHANGE UP (ref 5–17)
BASOPHILS # BLD AUTO: 0.07 K/UL — SIGNIFICANT CHANGE UP (ref 0–0.2)
BASOPHILS NFR BLD AUTO: 0.6 % — SIGNIFICANT CHANGE UP (ref 0–2)
BUN SERPL-MCNC: 16 MG/DL — SIGNIFICANT CHANGE UP (ref 7–23)
CALCIUM SERPL-MCNC: 8.9 MG/DL — SIGNIFICANT CHANGE UP (ref 8.4–10.5)
CHLORIDE SERPL-SCNC: 108 MMOL/L — SIGNIFICANT CHANGE UP (ref 96–108)
CO2 SERPL-SCNC: 30 MMOL/L — SIGNIFICANT CHANGE UP (ref 22–31)
CREAT SERPL-MCNC: 0.85 MG/DL — SIGNIFICANT CHANGE UP (ref 0.5–1.3)
CULTURE RESULTS: SIGNIFICANT CHANGE UP
EOSINOPHIL # BLD AUTO: 0.09 K/UL — SIGNIFICANT CHANGE UP (ref 0–0.5)
EOSINOPHIL NFR BLD AUTO: 0.8 % — SIGNIFICANT CHANGE UP (ref 0–6)
GLUCOSE SERPL-MCNC: 102 MG/DL — HIGH (ref 70–99)
HCT VFR BLD CALC: 42.2 % — SIGNIFICANT CHANGE UP (ref 39–50)
HCV AB S/CO SERPL IA: 0.19 S/CO — SIGNIFICANT CHANGE UP (ref 0–0.99)
HCV AB SERPL-IMP: SIGNIFICANT CHANGE UP
HGB BLD-MCNC: 13.4 G/DL — SIGNIFICANT CHANGE UP (ref 13–17)
IMM GRANULOCYTES NFR BLD AUTO: 0.9 % — SIGNIFICANT CHANGE UP (ref 0–1.5)
LACTATE SERPL-SCNC: 2.5 MMOL/L — HIGH (ref 0.7–2)
LACTATE SERPL-SCNC: 3.4 MMOL/L — HIGH (ref 0.7–2)
LYMPHOCYTES # BLD AUTO: 1.24 K/UL — SIGNIFICANT CHANGE UP (ref 1–3.3)
LYMPHOCYTES # BLD AUTO: 10.9 % — LOW (ref 13–44)
MAGNESIUM SERPL-MCNC: 1.9 MG/DL — SIGNIFICANT CHANGE UP (ref 1.6–2.6)
MCHC RBC-ENTMCNC: 27.8 PG — SIGNIFICANT CHANGE UP (ref 27–34)
MCHC RBC-ENTMCNC: 31.8 GM/DL — LOW (ref 32–36)
MCV RBC AUTO: 87.6 FL — SIGNIFICANT CHANGE UP (ref 80–100)
MONOCYTES # BLD AUTO: 0.54 K/UL — SIGNIFICANT CHANGE UP (ref 0–0.9)
MONOCYTES NFR BLD AUTO: 4.7 % — SIGNIFICANT CHANGE UP (ref 2–14)
NEUTROPHILS # BLD AUTO: 9.35 K/UL — HIGH (ref 1.8–7.4)
NEUTROPHILS NFR BLD AUTO: 82.1 % — HIGH (ref 43–77)
NRBC # BLD: 0 /100 WBCS — SIGNIFICANT CHANGE UP (ref 0–0)
PLATELET # BLD AUTO: 196 K/UL — SIGNIFICANT CHANGE UP (ref 150–400)
POTASSIUM SERPL-MCNC: 4.3 MMOL/L — SIGNIFICANT CHANGE UP (ref 3.5–5.3)
POTASSIUM SERPL-SCNC: 4.3 MMOL/L — SIGNIFICANT CHANGE UP (ref 3.5–5.3)
RBC # BLD: 4.82 M/UL — SIGNIFICANT CHANGE UP (ref 4.2–5.8)
RBC # FLD: 13.3 % — SIGNIFICANT CHANGE UP (ref 10.3–14.5)
SODIUM SERPL-SCNC: 145 MMOL/L — SIGNIFICANT CHANGE UP (ref 135–145)
SPECIMEN SOURCE: SIGNIFICANT CHANGE UP
WBC # BLD: 11.39 K/UL — HIGH (ref 3.8–10.5)
WBC # FLD AUTO: 11.39 K/UL — HIGH (ref 3.8–10.5)

## 2019-09-23 PROCEDURE — 99232 SBSQ HOSP IP/OBS MODERATE 35: CPT

## 2019-09-23 RX ORDER — KETOROLAC TROMETHAMINE 30 MG/ML
15 SYRINGE (ML) INJECTION ONCE
Refills: 0 | Status: DISCONTINUED | OUTPATIENT
Start: 2019-09-23 | End: 2019-09-23

## 2019-09-23 RX ORDER — SODIUM CHLORIDE 9 MG/ML
1000 INJECTION INTRAMUSCULAR; INTRAVENOUS; SUBCUTANEOUS
Refills: 0 | Status: DISCONTINUED | OUTPATIENT
Start: 2019-09-23 | End: 2019-09-25

## 2019-09-23 RX ORDER — SODIUM CHLORIDE 9 MG/ML
1000 INJECTION INTRAMUSCULAR; INTRAVENOUS; SUBCUTANEOUS ONCE
Refills: 0 | Status: COMPLETED | OUTPATIENT
Start: 2019-09-23 | End: 2019-09-23

## 2019-09-23 RX ORDER — OLANZAPINE 15 MG/1
2.5 TABLET, FILM COATED ORAL
Refills: 0 | Status: DISCONTINUED | OUTPATIENT
Start: 2019-09-23 | End: 2019-09-24

## 2019-09-23 RX ORDER — VANCOMYCIN HCL 1 G
1000 VIAL (EA) INTRAVENOUS EVERY 12 HOURS
Refills: 0 | Status: DISCONTINUED | OUTPATIENT
Start: 2019-09-23 | End: 2019-09-27

## 2019-09-23 RX ORDER — CEFEPIME 1 G/1
2000 INJECTION, POWDER, FOR SOLUTION INTRAMUSCULAR; INTRAVENOUS
Refills: 0 | Status: DISCONTINUED | OUTPATIENT
Start: 2019-09-24 | End: 2019-09-27

## 2019-09-23 RX ADMIN — CARBIDOPA AND LEVODOPA 1 TABLET(S): 25; 100 TABLET ORAL at 19:52

## 2019-09-23 RX ADMIN — QUETIAPINE FUMARATE 25 MILLIGRAM(S): 200 TABLET, FILM COATED ORAL at 21:29

## 2019-09-23 RX ADMIN — CARBIDOPA AND LEVODOPA 1 TABLET(S): 25; 100 TABLET ORAL at 11:29

## 2019-09-23 RX ADMIN — SODIUM CHLORIDE 1000 MILLILITER(S): 9 INJECTION INTRAMUSCULAR; INTRAVENOUS; SUBCUTANEOUS at 09:26

## 2019-09-23 RX ADMIN — CARBIDOPA AND LEVODOPA 1 TABLET(S): 25; 100 TABLET ORAL at 08:21

## 2019-09-23 RX ADMIN — RIVASTIGMINE 1 PATCH: 4.6 PATCH, EXTENDED RELEASE TRANSDERMAL at 21:33

## 2019-09-23 RX ADMIN — RIVASTIGMINE 1 PATCH: 4.6 PATCH, EXTENDED RELEASE TRANSDERMAL at 08:24

## 2019-09-23 RX ADMIN — Medication 250 MILLIGRAM(S): at 19:53

## 2019-09-23 RX ADMIN — Medication 1 APPLICATION(S): at 19:52

## 2019-09-23 RX ADMIN — SODIUM CHLORIDE 150 MILLILITER(S): 9 INJECTION INTRAMUSCULAR; INTRAVENOUS; SUBCUTANEOUS at 10:31

## 2019-09-23 RX ADMIN — Medication 1 TABLET(S): at 11:29

## 2019-09-23 RX ADMIN — OLANZAPINE 2.5 MILLIGRAM(S): 15 TABLET, FILM COATED ORAL at 19:52

## 2019-09-23 RX ADMIN — Medication 15 MILLIGRAM(S): at 14:15

## 2019-09-23 RX ADMIN — Medication 1 APPLICATION(S): at 08:22

## 2019-09-23 RX ADMIN — Medication 15 MILLIGRAM(S): at 14:30

## 2019-09-23 RX ADMIN — SODIUM CHLORIDE 150 MILLILITER(S): 9 INJECTION INTRAMUSCULAR; INTRAVENOUS; SUBCUTANEOUS at 21:30

## 2019-09-23 RX ADMIN — Medication 100 MILLIGRAM(S): at 11:29

## 2019-09-23 RX ADMIN — Medication 0.25 MILLIGRAM(S): at 05:56

## 2019-09-23 RX ADMIN — FINASTERIDE 5 MILLIGRAM(S): 5 TABLET, FILM COATED ORAL at 11:29

## 2019-09-23 RX ADMIN — Medication 250 MILLIGRAM(S): at 10:31

## 2019-09-23 RX ADMIN — ENOXAPARIN SODIUM 40 MILLIGRAM(S): 100 INJECTION SUBCUTANEOUS at 11:29

## 2019-09-23 RX ADMIN — SERTRALINE 50 MILLIGRAM(S): 25 TABLET, FILM COATED ORAL at 11:29

## 2019-09-23 RX ADMIN — CEFTRIAXONE 100 MILLIGRAM(S): 500 INJECTION, POWDER, FOR SOLUTION INTRAMUSCULAR; INTRAVENOUS at 05:59

## 2019-09-23 RX ADMIN — Medication 1 DROP(S): at 19:52

## 2019-09-23 RX ADMIN — CARBIDOPA AND LEVODOPA 1 TABLET(S): 25; 100 TABLET ORAL at 21:28

## 2019-09-23 RX ADMIN — RIVASTIGMINE 1 PATCH: 4.6 PATCH, EXTENDED RELEASE TRANSDERMAL at 05:16

## 2019-09-23 NOTE — DIETITIAN INITIAL EVALUATION ADULT. - PROBLEM SELECTOR PLAN 5
Found to have recurrent gout in [facility].  Last dose of tapering steroid on 9/23  start Allopurinol 100mg daily

## 2019-09-23 NOTE — PROGRESS NOTE ADULT - SUBJECTIVE AND OBJECTIVE BOX
Subjective   Mentation per baseline  Nursing reports patient tugging at folly  Objective  Vitals reviewed  Gen NAD  Pulm CTA  CVS RRR  Abdo Soft  Ext Folly in places  Assessment and plan  69M from home Parkinsons Dementia, BPH requiring indwelling folley, Gout, Ho of Proteus Mirabilis UTI with Sepsis  Admit [id number] for Chills,Cloudy urine, UTI and Sepsis    *Sepsis  Lactate increased overnight  Increase fluids  Broaden ABX coverage to Vanco/Cefepime  Repeat Lactate    Dementia with agitation  Pain control with Toradol   Discussed with wife, risks of antipsychotic tx > risks of harm to self and others  Will start low dose zyprexa   FU in AM, titrate as necessary    Sacral ulcer  Present before admission  Stage 2.   Frequent turning and reposisiton  Local wound care

## 2019-09-23 NOTE — CHART NOTE - NSCHARTNOTEFT_GEN_A_CORE
Upon Nutritional Assessment by the Registered Dietitian your patient was determined to meet criteria / has evidence of the following diagnosis/diagnoses:          [ ]  Mild Protein Calorie Malnutrition        [ ]  Moderate Protein Calorie Malnutrition        [X] Severe Protein Calorie Malnutrition; in context of acute illness        [ ] Unspecified Protein Calorie Malnutrition        [ ] Underweight / BMI <19        [ ] Morbid Obesity / BMI > 40      Findings as based on:  [X] Comprehensive nutrition assessment - pt consuming <50% of ENN x 1 week PTA; admitted w/ weakness, dehydration  [X] Nutrition Focused Physical Exam- moderate loss of muscle (shoulders, calves, thigh), moderate loss of fat from triceps   [X] Other: noted w/ stage II PU R buttocks, stage II PU L buttocks. hx of unintentional weight loss d/t recurrent UTI, increased nutrient needs (pt used to weigh 183 lbs last summer; now weighs 162 lbs).     Nutrition Plan/Recommendations:    1. Continue w/ current diet; recommend adding Ensure Enlive 240 ml PO BID (provides up to 700 kcal, 40 g protein), Ensure Pudding 4 oz. BID (provides up to 340 kcal, 8 g protein) to meet increased nutrient needs  2. Encourage PO intake- pt requires assistance & encouragement during meals per pt's wife  3. Monitor weight, skin, labs, bowels & follow up x 5 days and/or prn      PROVIDER Section:     By signing this assessment you are acknowledging and agree with the diagnosis/diagnoses assigned by the Registered Dietitian    Comments:

## 2019-09-23 NOTE — PROVIDER CONTACT NOTE (OTHER) - SITUATION
Patient was very combative to care and refusing all medications. Attempted to get out of bed without assistance multiple times.

## 2019-09-23 NOTE — DIETITIAN INITIAL EVALUATION ADULT. - OTHER INFO
69M h/o Parkinson's dementia, BPH, h/o gout BIBEMS from home for 2 days of generalized weakness associated with chills and foul smelling cloudy urine in oglesby. 1 week ago pt was admitted to [facility] for UTI and oglesby was started for urinary retention. Since discharge from [place], pt has worsening generalized weakness, decreased po intake and now unable to ambulate with walker due to weakness.  No fevers. + chills. No chest pain or sob. No nausea or vomiting, abdominal pain, or dysuria. Pt's wife states that pt had 3-4 episodes of UTI this past two years. Pt on dysphagia I pureed, nectar fluids d/t dysphagia; requires full assistance & encourgement during meals per pt's wife. Pt noted w/ stage II PU on R buttocks, stage II PU on L buttocks. Per discussion w/ pt's wife, pt used to weigh 183 lbs (last summer [2018]); current weight is 162 lbs (9/22). Nutrition-focused physical exam performed: moderate fat loss from triceps, back; moderate loss of muscle from shoulders, thighs, calves. Last BM 9/23. No edema noted per flowsheets. Discussed food preferences & supplements w/ pt's wife; recommend Ensure Enlive BID (provides up to 700 kcal, 40 g protein), Ensure Pudding BID (provides up to 340 kcal, 8 g protein) to meet increased nutrient needs.

## 2019-09-23 NOTE — DIETITIAN INITIAL EVALUATION ADULT. - PERTINENT LABORATORY DATA
(9/23) Hgb 13.4 wnl, Hct 42.2 wnl, Na 145 wnl, K 4.3 wnl Cl 108 wnl, CO2 30 wnl, BUN 16 wnl, Creat 0.85 wnl, Glu 102 H, eGFR 87 wnl

## 2019-09-23 NOTE — DIETITIAN INITIAL EVALUATION ADULT. - MALNUTRITION
severe; in context of acute (-on chronic) illness (recurrent UTI) severe; in context of acute illness

## 2019-09-23 NOTE — PROVIDER CONTACT NOTE (OTHER) - ASSESSMENT
Patient is alert and oriented x1-2 as baseline. Patient has history being combative to staff during care

## 2019-09-24 LAB
ALBUMIN SERPL ELPH-MCNC: 2.4 G/DL — LOW (ref 3.3–5)
ALP SERPL-CCNC: 65 U/L — SIGNIFICANT CHANGE UP (ref 40–120)
ALT FLD-CCNC: 7 U/L — LOW (ref 10–45)
ANION GAP SERPL CALC-SCNC: 6 MMOL/L — SIGNIFICANT CHANGE UP (ref 5–17)
AST SERPL-CCNC: 24 U/L — SIGNIFICANT CHANGE UP (ref 10–40)
BILIRUB SERPL-MCNC: 0.5 MG/DL — SIGNIFICANT CHANGE UP (ref 0.2–1.2)
BUN SERPL-MCNC: 16 MG/DL — SIGNIFICANT CHANGE UP (ref 7–23)
CALCIUM SERPL-MCNC: 8 MG/DL — LOW (ref 8.4–10.5)
CHLORIDE SERPL-SCNC: 110 MMOL/L — HIGH (ref 96–108)
CO2 SERPL-SCNC: 25 MMOL/L — SIGNIFICANT CHANGE UP (ref 22–31)
CREAT SERPL-MCNC: 0.87 MG/DL — SIGNIFICANT CHANGE UP (ref 0.5–1.3)
GLUCOSE SERPL-MCNC: 104 MG/DL — HIGH (ref 70–99)
HCT VFR BLD CALC: 36.2 % — LOW (ref 39–50)
HGB BLD-MCNC: 11.6 G/DL — LOW (ref 13–17)
LACTATE SERPL-SCNC: 0.8 MMOL/L — SIGNIFICANT CHANGE UP (ref 0.7–2)
MCHC RBC-ENTMCNC: 27.5 PG — SIGNIFICANT CHANGE UP (ref 27–34)
MCHC RBC-ENTMCNC: 32 GM/DL — SIGNIFICANT CHANGE UP (ref 32–36)
MCV RBC AUTO: 85.8 FL — SIGNIFICANT CHANGE UP (ref 80–100)
NRBC # BLD: 0 /100 WBCS — SIGNIFICANT CHANGE UP (ref 0–0)
PLATELET # BLD AUTO: 176 K/UL — SIGNIFICANT CHANGE UP (ref 150–400)
POTASSIUM SERPL-MCNC: 3.8 MMOL/L — SIGNIFICANT CHANGE UP (ref 3.5–5.3)
POTASSIUM SERPL-SCNC: 3.8 MMOL/L — SIGNIFICANT CHANGE UP (ref 3.5–5.3)
PROT SERPL-MCNC: 6.1 G/DL — SIGNIFICANT CHANGE UP (ref 6–8.3)
RBC # BLD: 4.22 M/UL — SIGNIFICANT CHANGE UP (ref 4.2–5.8)
RBC # FLD: 13.5 % — SIGNIFICANT CHANGE UP (ref 10.3–14.5)
SODIUM SERPL-SCNC: 141 MMOL/L — SIGNIFICANT CHANGE UP (ref 135–145)
WBC # BLD: 7.9 K/UL — SIGNIFICANT CHANGE UP (ref 3.8–10.5)
WBC # FLD AUTO: 7.9 K/UL — SIGNIFICANT CHANGE UP (ref 3.8–10.5)

## 2019-09-24 PROCEDURE — 99232 SBSQ HOSP IP/OBS MODERATE 35: CPT

## 2019-09-24 RX ORDER — QUETIAPINE FUMARATE 200 MG/1
50 TABLET, FILM COATED ORAL DAILY
Refills: 0 | Status: DISCONTINUED | OUTPATIENT
Start: 2019-09-24 | End: 2019-09-30

## 2019-09-24 RX ADMIN — Medication 1 DROP(S): at 06:53

## 2019-09-24 RX ADMIN — Medication 1 MILLIGRAM(S): at 13:02

## 2019-09-24 RX ADMIN — CEFEPIME 100 MILLIGRAM(S): 1 INJECTION, POWDER, FOR SOLUTION INTRAMUSCULAR; INTRAVENOUS at 08:17

## 2019-09-24 RX ADMIN — FINASTERIDE 5 MILLIGRAM(S): 5 TABLET, FILM COATED ORAL at 13:09

## 2019-09-24 RX ADMIN — RIVASTIGMINE 1 PATCH: 4.6 PATCH, EXTENDED RELEASE TRANSDERMAL at 06:00

## 2019-09-24 RX ADMIN — SODIUM CHLORIDE 150 MILLILITER(S): 9 INJECTION INTRAMUSCULAR; INTRAVENOUS; SUBCUTANEOUS at 06:58

## 2019-09-24 RX ADMIN — Medication 1 DROP(S): at 17:12

## 2019-09-24 RX ADMIN — Medication 1 APPLICATION(S): at 17:11

## 2019-09-24 RX ADMIN — Medication 250 MILLIGRAM(S): at 17:11

## 2019-09-24 RX ADMIN — RIVASTIGMINE 1 PATCH: 4.6 PATCH, EXTENDED RELEASE TRANSDERMAL at 06:54

## 2019-09-24 RX ADMIN — CARBIDOPA AND LEVODOPA 1 TABLET(S): 25; 100 TABLET ORAL at 13:08

## 2019-09-24 RX ADMIN — CARBIDOPA AND LEVODOPA 1 TABLET(S): 25; 100 TABLET ORAL at 08:28

## 2019-09-24 RX ADMIN — Medication 0.5 MILLIGRAM(S): at 10:27

## 2019-09-24 RX ADMIN — CEFEPIME 100 MILLIGRAM(S): 1 INJECTION, POWDER, FOR SOLUTION INTRAMUSCULAR; INTRAVENOUS at 17:11

## 2019-09-24 RX ADMIN — RIVASTIGMINE 1 PATCH: 4.6 PATCH, EXTENDED RELEASE TRANSDERMAL at 17:13

## 2019-09-24 RX ADMIN — SERTRALINE 50 MILLIGRAM(S): 25 TABLET, FILM COATED ORAL at 13:09

## 2019-09-24 RX ADMIN — Medication 1 APPLICATION(S): at 06:54

## 2019-09-24 RX ADMIN — ENOXAPARIN SODIUM 40 MILLIGRAM(S): 100 INJECTION SUBCUTANEOUS at 13:08

## 2019-09-24 RX ADMIN — Medication 100 MILLIGRAM(S): at 13:08

## 2019-09-24 RX ADMIN — OLANZAPINE 2.5 MILLIGRAM(S): 15 TABLET, FILM COATED ORAL at 06:54

## 2019-09-24 RX ADMIN — RIVASTIGMINE 1 PATCH: 4.6 PATCH, EXTENDED RELEASE TRANSDERMAL at 08:33

## 2019-09-24 RX ADMIN — Medication 1 TABLET(S): at 13:09

## 2019-09-24 RX ADMIN — Medication 250 MILLIGRAM(S): at 06:56

## 2019-09-24 NOTE — PROGRESS NOTE ADULT - SUBJECTIVE AND OBJECTIVE BOX
Admitted for UTI / cloudy urine / lethargy.    WBC normalized. Oglesby with good urine output. Afebrile this AM.      ROS  : oglesby draining well - no cloudy urine    VITAL SIGNS  Vital Signs Last 24 Hrs  T(C): 37 (23 Sep 2019 21:23), Max: 37 (23 Sep 2019 21:23)  T(F): 98.6 (23 Sep 2019 21:23), Max: 98.6 (23 Sep 2019 21:23)  HR: 54 (24 Sep 2019 05:28) (51 - 54)  BP: 166/61  23 Sep 2019 07:01  -  24 Sep 2019 07:00  --------------------------------------------------------  IN: 1960 mL / OUT: 1100 mL / NET: 860 mL    24 Sep 2019 07:01  -  24 Sep 2019 11:01  --------------------------------------------------------  IN: 0 mL / OUT: 1000 mL / NET: -1000 mL        PHYSICAL EXAM:  General: appears comfortable  : Oglesby with mildly blood tinged urine      LABS:                        11.6   7.90  )-----------( 176      ( 24 Sep 2019 10:00 )             36.2     -    141  |  110<H>  |  16  ----------------------------<  104<H>  3.8   |  25  |  0.87    Ca    8.0<L>      24 Sep 2019 10:00  Mg     1.9         TPro  6.1  /  Alb  2.4<L>  /  TBili  0.5  /  DBili  x   /  AST  24  /  ALT  7<L>  /  AlkPhos  65      Urinalysis Basic - ( 22 Sep 2019 13:55 )    Color: Yellow / Appearance: Clear / S.015 / pH: x  Gluc: x / Ketone: Negative  / Bili: Negative / Urobili: Negative   Blood: x / Protein: 100 / Nitrite: Positive   Leuk Esterase: Moderate / RBC: >50 /HPF / WBC >50 /HPF   Sq Epi: x / Non Sq Epi: Neg.-Few / Bacteria: Many /HPF        Prior notes/chart reviewed.

## 2019-09-24 NOTE — PROVIDER CONTACT NOTE (OTHER) - ASSESSMENT
Patient received Ativan 1 mg at 1200 due to agitation behavior and pulling oglesby. VS /62 HR 56 T 99.8 O2sat 96%.

## 2019-09-24 NOTE — PROGRESS NOTE ADULT - ASSESSMENT
WBC improved with abx. Oglesby draining well. No fever.    PLAN:  - continue oglesby  - continue abx  - follow cultures

## 2019-09-24 NOTE — PROGRESS NOTE ADULT - SUBJECTIVE AND OBJECTIVE BOX
Subjective   Mentation per baseline  Nursing reports patient tugging at folly  Objective  Vitals reviewed  Gen NAD  Pulm CTA  CVS RRR  Abdo Soft  Ext Folly in places  Assessment and plan  69M from home Parkinsons Dementia, BPH requiring indwelling folley, Gout, Ho of Proteus Mirabilis UTI with Sepsis  Admit [id number] for Chills,Cloudy urine, UTI and Sepsis    *Sepsis  Lactate stabilized overnight  Continue current fluid order  Broaden ABX coverage to Vanco/Cefepime  Repeat Lactate now    Dementia with agitation  Pain control with Toradol   Discussed with wife, risks of antipsychotic tx > risks of harm to self and others  Already on antipsychotics  Will titrate meds  Ativan PRN for agitation     Sacral ulcer  Present before admission  Stage 2.   Frequent turning and reposisiton  Local wound care

## 2019-09-25 LAB
ALBUMIN SERPL ELPH-MCNC: 2.6 G/DL — LOW (ref 3.3–5)
ALP SERPL-CCNC: 68 U/L — SIGNIFICANT CHANGE UP (ref 40–120)
ALT FLD-CCNC: 22 U/L — SIGNIFICANT CHANGE UP (ref 10–45)
ANION GAP SERPL CALC-SCNC: 10 MMOL/L — SIGNIFICANT CHANGE UP (ref 5–17)
APPEARANCE UR: CLEAR — SIGNIFICANT CHANGE UP
AST SERPL-CCNC: 17 U/L — SIGNIFICANT CHANGE UP (ref 10–40)
BACTERIA # UR AUTO: ABNORMAL /HPF
BILIRUB SERPL-MCNC: 0.8 MG/DL — SIGNIFICANT CHANGE UP (ref 0.2–1.2)
BILIRUB UR-MCNC: NEGATIVE — SIGNIFICANT CHANGE UP
BUN SERPL-MCNC: 12 MG/DL — SIGNIFICANT CHANGE UP (ref 7–23)
CALCIUM SERPL-MCNC: 8.6 MG/DL — SIGNIFICANT CHANGE UP (ref 8.4–10.5)
CHLORIDE SERPL-SCNC: 108 MMOL/L — SIGNIFICANT CHANGE UP (ref 96–108)
CO2 SERPL-SCNC: 26 MMOL/L — SIGNIFICANT CHANGE UP (ref 22–31)
COLOR SPEC: YELLOW — SIGNIFICANT CHANGE UP
COMMENT - URINE: SIGNIFICANT CHANGE UP
CREAT SERPL-MCNC: 0.95 MG/DL — SIGNIFICANT CHANGE UP (ref 0.5–1.3)
DIFF PNL FLD: ABNORMAL
EPI CELLS # UR: SIGNIFICANT CHANGE UP
GLUCOSE SERPL-MCNC: 105 MG/DL — HIGH (ref 70–99)
GLUCOSE UR QL: NEGATIVE — SIGNIFICANT CHANGE UP
HCT VFR BLD CALC: 40.4 % — SIGNIFICANT CHANGE UP (ref 39–50)
HGB BLD-MCNC: 13.1 G/DL — SIGNIFICANT CHANGE UP (ref 13–17)
KETONES UR-MCNC: NEGATIVE — SIGNIFICANT CHANGE UP
LDH SERPL L TO P-CCNC: 197 U/L — SIGNIFICANT CHANGE UP (ref 50–242)
LEUKOCYTE ESTERASE UR-ACNC: ABNORMAL
MCHC RBC-ENTMCNC: 28.5 PG — SIGNIFICANT CHANGE UP (ref 27–34)
MCHC RBC-ENTMCNC: 32.4 GM/DL — SIGNIFICANT CHANGE UP (ref 32–36)
MCV RBC AUTO: 87.8 FL — SIGNIFICANT CHANGE UP (ref 80–100)
NITRITE UR-MCNC: NEGATIVE — SIGNIFICANT CHANGE UP
NRBC # BLD: 0 /100 WBCS — SIGNIFICANT CHANGE UP (ref 0–0)
PH UR: 6 — SIGNIFICANT CHANGE UP (ref 5–8)
PLATELET # BLD AUTO: 178 K/UL — SIGNIFICANT CHANGE UP (ref 150–400)
POTASSIUM SERPL-MCNC: 3.8 MMOL/L — SIGNIFICANT CHANGE UP (ref 3.5–5.3)
POTASSIUM SERPL-SCNC: 3.8 MMOL/L — SIGNIFICANT CHANGE UP (ref 3.5–5.3)
PROT SERPL-MCNC: 6.7 G/DL — SIGNIFICANT CHANGE UP (ref 6–8.3)
PROT UR-MCNC: 30 MG/DL
RBC # BLD: 4.6 M/UL — SIGNIFICANT CHANGE UP (ref 4.2–5.8)
RBC # FLD: 13.4 % — SIGNIFICANT CHANGE UP (ref 10.3–14.5)
RBC CASTS # UR COMP ASSIST: ABNORMAL /HPF (ref 0–4)
SODIUM SERPL-SCNC: 144 MMOL/L — SIGNIFICANT CHANGE UP (ref 135–145)
SP GR SPEC: 1.01 — SIGNIFICANT CHANGE UP (ref 1.01–1.02)
UROBILINOGEN FLD QL: NEGATIVE — SIGNIFICANT CHANGE UP
VANCOMYCIN TROUGH SERPL-MCNC: 16.2 UG/ML — SIGNIFICANT CHANGE UP (ref 10–20)
WBC # BLD: 11.62 K/UL — HIGH (ref 3.8–10.5)
WBC # FLD AUTO: 11.62 K/UL — HIGH (ref 3.8–10.5)
WBC UR QL: SIGNIFICANT CHANGE UP /HPF (ref 0–5)

## 2019-09-25 PROCEDURE — 99223 1ST HOSP IP/OBS HIGH 75: CPT

## 2019-09-25 PROCEDURE — 99232 SBSQ HOSP IP/OBS MODERATE 35: CPT

## 2019-09-25 RX ORDER — TAMSULOSIN HYDROCHLORIDE 0.4 MG/1
0.8 CAPSULE ORAL AT BEDTIME
Refills: 0 | Status: DISCONTINUED | OUTPATIENT
Start: 2019-09-25 | End: 2019-09-30

## 2019-09-25 RX ORDER — SODIUM CHLORIDE 9 MG/ML
1000 INJECTION, SOLUTION INTRAVENOUS
Refills: 0 | Status: DISCONTINUED | OUTPATIENT
Start: 2019-09-25 | End: 2019-09-27

## 2019-09-25 RX ADMIN — Medication 100 MILLIGRAM(S): at 12:29

## 2019-09-25 RX ADMIN — RIVASTIGMINE 1 PATCH: 4.6 PATCH, EXTENDED RELEASE TRANSDERMAL at 05:11

## 2019-09-25 RX ADMIN — CARBIDOPA AND LEVODOPA 1 TABLET(S): 25; 100 TABLET ORAL at 08:31

## 2019-09-25 RX ADMIN — Medication 0.5 MILLIGRAM(S): at 10:30

## 2019-09-25 RX ADMIN — Medication 1 DROP(S): at 05:26

## 2019-09-25 RX ADMIN — Medication 250 MILLIGRAM(S): at 17:31

## 2019-09-25 RX ADMIN — Medication 1 DROP(S): at 17:32

## 2019-09-25 RX ADMIN — RIVASTIGMINE 1 PATCH: 4.6 PATCH, EXTENDED RELEASE TRANSDERMAL at 05:22

## 2019-09-25 RX ADMIN — SERTRALINE 50 MILLIGRAM(S): 25 TABLET, FILM COATED ORAL at 12:29

## 2019-09-25 RX ADMIN — CARBIDOPA AND LEVODOPA 1 TABLET(S): 25; 100 TABLET ORAL at 22:09

## 2019-09-25 RX ADMIN — CEFEPIME 100 MILLIGRAM(S): 1 INJECTION, POWDER, FOR SOLUTION INTRAMUSCULAR; INTRAVENOUS at 17:32

## 2019-09-25 RX ADMIN — Medication 1 APPLICATION(S): at 05:27

## 2019-09-25 RX ADMIN — RIVASTIGMINE 1 PATCH: 4.6 PATCH, EXTENDED RELEASE TRANSDERMAL at 07:11

## 2019-09-25 RX ADMIN — SODIUM CHLORIDE 75 MILLILITER(S): 9 INJECTION, SOLUTION INTRAVENOUS at 17:33

## 2019-09-25 RX ADMIN — CEFEPIME 100 MILLIGRAM(S): 1 INJECTION, POWDER, FOR SOLUTION INTRAMUSCULAR; INTRAVENOUS at 05:17

## 2019-09-25 RX ADMIN — Medication 1 APPLICATION(S): at 17:32

## 2019-09-25 RX ADMIN — ENOXAPARIN SODIUM 40 MILLIGRAM(S): 100 INJECTION SUBCUTANEOUS at 12:30

## 2019-09-25 RX ADMIN — CARBIDOPA AND LEVODOPA 1 TABLET(S): 25; 100 TABLET ORAL at 12:29

## 2019-09-25 RX ADMIN — RIVASTIGMINE 1 PATCH: 4.6 PATCH, EXTENDED RELEASE TRANSDERMAL at 17:56

## 2019-09-25 RX ADMIN — SODIUM CHLORIDE 75 MILLILITER(S): 9 INJECTION, SOLUTION INTRAVENOUS at 22:09

## 2019-09-25 RX ADMIN — CARBIDOPA AND LEVODOPA 1 TABLET(S): 25; 100 TABLET ORAL at 16:12

## 2019-09-25 RX ADMIN — FINASTERIDE 5 MILLIGRAM(S): 5 TABLET, FILM COATED ORAL at 12:29

## 2019-09-25 RX ADMIN — Medication 250 MILLIGRAM(S): at 05:22

## 2019-09-25 RX ADMIN — TAMSULOSIN HYDROCHLORIDE 0.8 MILLIGRAM(S): 0.4 CAPSULE ORAL at 22:08

## 2019-09-25 NOTE — PROGRESS NOTE ADULT - SUBJECTIVE AND OBJECTIVE BOX
Oglesby changed yesterday as was blocked. Now draining well with clear urine.    ROS  General: no fever or chills    VITAL SIGNS  Vital Signs Last 24 Hrs  T(C): 37.2 (25 Sep 2019 05:00), Max: 37.7 (24 Sep 2019 21:11)  T(F): 99 (25 Sep 2019 05:00), Max: 99.8 (24 Sep 2019 21:11)  HR: 55 (25 Sep 2019 05:00) (53 - 56)  BP: 141/70     PHYSICAL EXAM:  General: confused  : oglesby clear      LABS:                        11.6   7.90  )-----------( 176      ( 24 Sep 2019 10:00 )             36.2     09-24    141  |  110<H>  |  16  ----------------------------<  104<H>  3.8   |  25  |  0.87    Ca    8.0<L>      24 Sep 2019 10:00    TPro  6.1  /  Alb  2.4<L>  /  TBili  0.5  /  DBili  x   /  AST  24  /  ALT  7<L>  /  AlkPhos  65  09-24        Prior notes/chart reviewed.

## 2019-09-25 NOTE — GOALS OF CARE CONVERSATION - ADVANCED CARE PLANNING - CONVERSATION DETAILS
Hospice care reviewed with family.  Overall pt has had worsening status over the past several months.  Hospice/comfort care appropriate.

## 2019-09-25 NOTE — PROGRESS NOTE ADULT - SUBJECTIVE AND OBJECTIVE BOX
Subjective   Mentation per baseline  Nursing reports patient sedated overnight  SP one dose of 1mg IV ativan  Objective  Vitals reviewed  Gen NAD  Pulm CTA  CVS RRR  Abdo Soft  Ext Folly in places  Assessment and plan  69M from home Parkinsons Dementia, BPH requiring indwelling folley, Gout, Ho of Proteus Mirabilis UTI with Sepsis  Admit [id number] for Chills,Cloudy urine, UTI and Sepsis    *Sepsis  Lactate normal  Required Vanco/cefepime  History of proteus mirabilis  Will cont. current ABX  Repeat UA/UC although I doubt it will be positive now  On Proscar  Restart Flomax. If sulpha based reaction, will hold  otherwise TOV in 48 hours    Dementia with agitation  Titrated Antipsychotics yesterday  but patient too agitated to take medicien  SP stat dose of 1mg IV ativan  Too sedating  Will give 0.5mg today for aggitation  Hospice eval      Sacral ulcer  Present before admission  Stage 2.   Frequent turning and reposisiton  Local wound care

## 2019-09-25 NOTE — PROGRESS NOTE ADULT - ASSESSMENT
Admitted with lethargy and UTI. Kunz changed yesterday. D/w medical team and would recommend void trial in 48 hrs.    PLAN:    - continue Flomax / Proscar  - void trial in 2 days

## 2019-09-26 LAB
BASOPHILS # BLD AUTO: 0.04 K/UL — SIGNIFICANT CHANGE UP (ref 0–0.2)
BASOPHILS NFR BLD AUTO: 0.5 % — SIGNIFICANT CHANGE UP (ref 0–2)
CULTURE RESULTS: NO GROWTH — SIGNIFICANT CHANGE UP
EOSINOPHIL # BLD AUTO: 0.15 K/UL — SIGNIFICANT CHANGE UP (ref 0–0.5)
EOSINOPHIL NFR BLD AUTO: 1.8 % — SIGNIFICANT CHANGE UP (ref 0–6)
GLUCOSE BLDC GLUCOMTR-MCNC: 175 MG/DL — HIGH (ref 70–99)
HCT VFR BLD CALC: 36.1 % — LOW (ref 39–50)
HGB BLD-MCNC: 12 G/DL — LOW (ref 13–17)
IMM GRANULOCYTES NFR BLD AUTO: 0.7 % — SIGNIFICANT CHANGE UP (ref 0–1.5)
LYMPHOCYTES # BLD AUTO: 0.94 K/UL — LOW (ref 1–3.3)
LYMPHOCYTES # BLD AUTO: 11.5 % — LOW (ref 13–44)
MCHC RBC-ENTMCNC: 28.6 PG — SIGNIFICANT CHANGE UP (ref 27–34)
MCHC RBC-ENTMCNC: 33.2 GM/DL — SIGNIFICANT CHANGE UP (ref 32–36)
MCV RBC AUTO: 86 FL — SIGNIFICANT CHANGE UP (ref 80–100)
MONOCYTES # BLD AUTO: 0.42 K/UL — SIGNIFICANT CHANGE UP (ref 0–0.9)
MONOCYTES NFR BLD AUTO: 5.2 % — SIGNIFICANT CHANGE UP (ref 2–14)
NEUTROPHILS # BLD AUTO: 6.54 K/UL — SIGNIFICANT CHANGE UP (ref 1.8–7.4)
NEUTROPHILS NFR BLD AUTO: 80.3 % — HIGH (ref 43–77)
NRBC # BLD: 0 /100 WBCS — SIGNIFICANT CHANGE UP (ref 0–0)
PLATELET # BLD AUTO: 165 K/UL — SIGNIFICANT CHANGE UP (ref 150–400)
RBC # BLD: 4.2 M/UL — SIGNIFICANT CHANGE UP (ref 4.2–5.8)
RBC # FLD: 13.2 % — SIGNIFICANT CHANGE UP (ref 10.3–14.5)
SPECIMEN SOURCE: SIGNIFICANT CHANGE UP
WBC # BLD: 8.15 K/UL — SIGNIFICANT CHANGE UP (ref 3.8–10.5)
WBC # FLD AUTO: 8.15 K/UL — SIGNIFICANT CHANGE UP (ref 3.8–10.5)

## 2019-09-26 PROCEDURE — 99232 SBSQ HOSP IP/OBS MODERATE 35: CPT

## 2019-09-26 RX ORDER — HALOPERIDOL DECANOATE 100 MG/ML
1 INJECTION INTRAMUSCULAR ONCE
Refills: 0 | Status: COMPLETED | OUTPATIENT
Start: 2019-09-26 | End: 2019-09-26

## 2019-09-26 RX ADMIN — TAMSULOSIN HYDROCHLORIDE 0.8 MILLIGRAM(S): 0.4 CAPSULE ORAL at 21:02

## 2019-09-26 RX ADMIN — Medication 1 APPLICATION(S): at 17:42

## 2019-09-26 RX ADMIN — CARBIDOPA AND LEVODOPA 1 TABLET(S): 25; 100 TABLET ORAL at 21:02

## 2019-09-26 RX ADMIN — Medication 100 MILLIGRAM(S): at 12:57

## 2019-09-26 RX ADMIN — FINASTERIDE 5 MILLIGRAM(S): 5 TABLET, FILM COATED ORAL at 12:57

## 2019-09-26 RX ADMIN — RIVASTIGMINE 1 PATCH: 4.6 PATCH, EXTENDED RELEASE TRANSDERMAL at 05:07

## 2019-09-26 RX ADMIN — RIVASTIGMINE 1 PATCH: 4.6 PATCH, EXTENDED RELEASE TRANSDERMAL at 06:17

## 2019-09-26 RX ADMIN — Medication 250 MILLIGRAM(S): at 17:42

## 2019-09-26 RX ADMIN — HALOPERIDOL DECANOATE 1 MILLIGRAM(S): 100 INJECTION INTRAMUSCULAR at 23:33

## 2019-09-26 RX ADMIN — QUETIAPINE FUMARATE 50 MILLIGRAM(S): 200 TABLET, FILM COATED ORAL at 12:57

## 2019-09-26 RX ADMIN — RIVASTIGMINE 1 PATCH: 4.6 PATCH, EXTENDED RELEASE TRANSDERMAL at 21:00

## 2019-09-26 RX ADMIN — SERTRALINE 50 MILLIGRAM(S): 25 TABLET, FILM COATED ORAL at 12:57

## 2019-09-26 RX ADMIN — CEFEPIME 100 MILLIGRAM(S): 1 INJECTION, POWDER, FOR SOLUTION INTRAMUSCULAR; INTRAVENOUS at 17:40

## 2019-09-26 RX ADMIN — CARBIDOPA AND LEVODOPA 1 TABLET(S): 25; 100 TABLET ORAL at 17:41

## 2019-09-26 RX ADMIN — Medication 1 DROP(S): at 21:02

## 2019-09-26 RX ADMIN — ENOXAPARIN SODIUM 40 MILLIGRAM(S): 100 INJECTION SUBCUTANEOUS at 12:57

## 2019-09-26 RX ADMIN — CEFEPIME 100 MILLIGRAM(S): 1 INJECTION, POWDER, FOR SOLUTION INTRAMUSCULAR; INTRAVENOUS at 05:08

## 2019-09-26 RX ADMIN — RIVASTIGMINE 1 PATCH: 4.6 PATCH, EXTENDED RELEASE TRANSDERMAL at 09:52

## 2019-09-26 RX ADMIN — Medication 1 DROP(S): at 05:08

## 2019-09-26 RX ADMIN — Medication 250 MILLIGRAM(S): at 05:09

## 2019-09-26 RX ADMIN — CARBIDOPA AND LEVODOPA 1 TABLET(S): 25; 100 TABLET ORAL at 12:57

## 2019-09-26 RX ADMIN — CARBIDOPA AND LEVODOPA 1 TABLET(S): 25; 100 TABLET ORAL at 09:50

## 2019-09-26 RX ADMIN — Medication 1 APPLICATION(S): at 05:08

## 2019-09-26 NOTE — PHYSICAL THERAPY INITIAL EVALUATION ADULT - ADDITIONAL COMMENTS
Pt is unable to provide social history due to dementia.  As per chart, pt lives in house with spouse, has aide 12 hours/day.  Pt was able to ambulate with RW

## 2019-09-26 NOTE — PROGRESS NOTE ADULT - SUBJECTIVE AND OBJECTIVE BOX
Follow-up Pall Progress Note    Neo Bailey MD  [phone]    No new respiratory events overnight.  Denies SOB/CP. Comfortable.   No apparent pain or dyspnea    Medications:  Vital Signs Last 24 Hrs  T(C): 36.9 (26 Sep 2019 15:42), Max: 36.9 (26 Sep 2019 15:42)  T(F): 98.4 (26 Sep 2019 15:42), Max: 98.4 (26 Sep 2019 15:42)  HR: 72 (26 Sep 2019 15:42) (60 - 72)  BP: 100/61 (26 Sep 2019 15:42) (100/61 - 149/73)  BP(mean): --  RR: 16 (26 Sep 2019 15:42) (16 - 16)  SpO2: 95% (26 Sep 2019 15:42) (95% - 96%)          09-25 @ 07:01  -  09-26 @ 07:00  --------------------------------------------------------  IN: 2060 mL / OUT: 500 mL / NET: 1560 mL        LABS:                        12.0   8.15  )-----------( 165      ( 26 Sep 2019 12:33 )             36.1     09-25    144  |  108  |  12  ----------------------------<  105<H>  3.8   |  26  |  0.95    Ca    8.6      25 Sep 2019 08:15    TPro  6.7  /  Alb  2.6<L>  /  TBili  0.8  /  DBili  x   /  AST  17  /  ALT  22  /  AlkPhos  68  09-25              CULTURES:  Culture Results:   No growth (09-25 @ 10:38)  Culture Results:   >=3 organisms. Probable collection contamination. (09-22 @ 13:55)  Culture Results:   No growth to date. (09-22 @ 13:20)  Culture Results:   No growth to date. (09-22 @ 13:20)    Most recent blood culture -- 09-25 @ 10:38   -- -- .Urine Catheterized 09-25 @ 10:38  Most recent blood culture -- 09-22 @ 13:55   -- -- .Urine Catheterized 09-22 @ 13:55  Most recent blood culture -- 09-22 @ 13:20   -- -- .Blood Blood-Peripheral 09-22 @ 13:20      Physical Examination:  PULM: Clear to auscultation bilaterally, no significant sputum production  CVS: Regular rate and rhythm, no murmurs, rubs, or gallops  ABD: Soft, non-tender  EXT:  No clubbing, cyanosis,   CXR:    CT chest:    TTE:

## 2019-09-26 NOTE — PROGRESS NOTE ADULT - SUBJECTIVE AND OBJECTIVE BOX
More alert. Oglesby remains clear.      ROS  General: no fever or chills    VITAL SIGNS  Vital Signs Last 24 Hrs  T(C): 36.7 (26 Sep 2019 05:14), Max: 36.9 (25 Sep 2019 16:04)  T(F): 98.1 (26 Sep 2019 05:14), Max: 98.5 (25 Sep 2019 16:04)  HR: 71 (26 Sep 2019 05:14) (60 - 71)  BP: 148/75         PHYSICAL EXAM:  General: comfortable  Abdomen: bladder NT  : oglesby clear      LABS:                        13.1   11.62 )-----------( 178      ( 25 Sep 2019 08:15 )             40.4     09-25    144  |  108  |  12  ----------------------------<  105<H>  3.8   |  26  |  0.95          Prior notes/chart reviewed.

## 2019-09-26 NOTE — PROGRESS NOTE ADULT - SUBJECTIVE AND OBJECTIVE BOX
Subjective   Mentation per baseline  Nursing reports patient sedated two nights ago  SP lower dose of ativan  Objective  Vitals reviewed  Gen NAD  Pulm CTA  CVS RRR  Abdo Soft  Ext Kunz in place  Assessment and plan  69M from home Parkinsons Dementia, BPH requiring indwelling folley, Gout, Ho of Proteus Mirabilis UTI with Sepsis  Admit [id number] for Chills,Cloudy urine, UTI and Sepsis    *Sepsis  Lactate normal  Required Vanco/cefepime  History of proteus mirabilis  Will cont. current ABX  Repeat UA/UC although I doubt it will be positive now  On Proscar  Restarted Flomax yesterday. no issue  TOV today    Dementia with agitation  Titrated Antipsychotics yesterday  but patient too agitated to take medicine  SP stat dose of 1mg IV ativan  Too sedating  Gave 0.5mg subsequent day  Still to sedating  Family elected to DC ativan and  start home THC oil  Patient comfortable today  SP Hospice eval for severe dementia      Sacral ulcer  Present before admission  Stage 2.   Frequent turning and reposisiton  Local wound care

## 2019-09-26 NOTE — PROGRESS NOTE ADULT - ASSESSMENT
Admitted with lethargy and likely UTI. Kunz clear. On BPH meds.    PLAN:  - Continue Kunz  - Continue BPH meds  - Void trial tomorrow if remains clinically stable

## 2019-09-27 ENCOUNTER — TRANSCRIPTION ENCOUNTER (OUTPATIENT)
Age: 69
End: 2019-09-27

## 2019-09-27 LAB
CULTURE RESULTS: SIGNIFICANT CHANGE UP
CULTURE RESULTS: SIGNIFICANT CHANGE UP
SPECIMEN SOURCE: SIGNIFICANT CHANGE UP
SPECIMEN SOURCE: SIGNIFICANT CHANGE UP

## 2019-09-27 PROCEDURE — 99232 SBSQ HOSP IP/OBS MODERATE 35: CPT

## 2019-09-27 PROCEDURE — 99233 SBSQ HOSP IP/OBS HIGH 50: CPT

## 2019-09-27 RX ORDER — ACETAMINOPHEN 500 MG
1 TABLET ORAL
Qty: 0 | Refills: 0 | DISCHARGE

## 2019-09-27 RX ORDER — ALLOPURINOL 300 MG
1 TABLET ORAL
Qty: 0 | Refills: 0 | DISCHARGE
Start: 2019-09-27

## 2019-09-27 RX ORDER — CIPROFLOXACIN LACTATE 400MG/40ML
1 VIAL (ML) INTRAVENOUS
Qty: 0 | Refills: 0 | DISCHARGE
Start: 2019-09-27

## 2019-09-27 RX ORDER — TAMSULOSIN HYDROCHLORIDE 0.4 MG/1
2 CAPSULE ORAL
Qty: 0 | Refills: 0 | DISCHARGE
Start: 2019-09-27

## 2019-09-27 RX ADMIN — SERTRALINE 50 MILLIGRAM(S): 25 TABLET, FILM COATED ORAL at 13:23

## 2019-09-27 RX ADMIN — RIVASTIGMINE 1 PATCH: 4.6 PATCH, EXTENDED RELEASE TRANSDERMAL at 07:40

## 2019-09-27 RX ADMIN — CARBIDOPA AND LEVODOPA 1 TABLET(S): 25; 100 TABLET ORAL at 22:50

## 2019-09-27 RX ADMIN — RIVASTIGMINE 1 PATCH: 4.6 PATCH, EXTENDED RELEASE TRANSDERMAL at 22:51

## 2019-09-27 RX ADMIN — RIVASTIGMINE 1 PATCH: 4.6 PATCH, EXTENDED RELEASE TRANSDERMAL at 07:49

## 2019-09-27 RX ADMIN — Medication 1 DROP(S): at 07:37

## 2019-09-27 RX ADMIN — Medication 100 MILLIGRAM(S): at 13:23

## 2019-09-27 RX ADMIN — Medication 1 APPLICATION(S): at 07:38

## 2019-09-27 RX ADMIN — TAMSULOSIN HYDROCHLORIDE 0.8 MILLIGRAM(S): 0.4 CAPSULE ORAL at 22:50

## 2019-09-27 RX ADMIN — CARBIDOPA AND LEVODOPA 1 TABLET(S): 25; 100 TABLET ORAL at 17:33

## 2019-09-27 RX ADMIN — CARBIDOPA AND LEVODOPA 1 TABLET(S): 25; 100 TABLET ORAL at 09:32

## 2019-09-27 RX ADMIN — Medication 1 APPLICATION(S): at 17:32

## 2019-09-27 RX ADMIN — SODIUM CHLORIDE 75 MILLILITER(S): 9 INJECTION, SOLUTION INTRAVENOUS at 07:47

## 2019-09-27 RX ADMIN — CARBIDOPA AND LEVODOPA 1 TABLET(S): 25; 100 TABLET ORAL at 13:23

## 2019-09-27 RX ADMIN — QUETIAPINE FUMARATE 50 MILLIGRAM(S): 200 TABLET, FILM COATED ORAL at 13:23

## 2019-09-27 RX ADMIN — Medication 1 DROP(S): at 17:32

## 2019-09-27 RX ADMIN — FINASTERIDE 5 MILLIGRAM(S): 5 TABLET, FILM COATED ORAL at 13:23

## 2019-09-27 RX ADMIN — ENOXAPARIN SODIUM 40 MILLIGRAM(S): 100 INJECTION SUBCUTANEOUS at 13:23

## 2019-09-27 RX ADMIN — Medication 250 MILLIGRAM(S): at 07:51

## 2019-09-27 RX ADMIN — CEFEPIME 100 MILLIGRAM(S): 1 INJECTION, POWDER, FOR SOLUTION INTRAMUSCULAR; INTRAVENOUS at 07:37

## 2019-09-27 NOTE — DISCHARGE NOTE PROVIDER - HOSPITAL COURSE
69M from home Parkinsons Dementia, BPH requiring indwelling folley, Gout, Ho of Proteus Mirabilis UTI with Sepsis    Admit [id number] for Chills,Cloudy urine, UTI, Sepsis with shock    Slightly hypotensive on admission    Came to medical floor    Required additional saline boluses    ABX broadened to Vanco/Cefepime    Condition improved    BP stabilized    PT however aggitated, pulling at oglesby    numerous interventions tried, including ativan and    increase in antipsychotic    patient became too sedated    Family elected to try THC oil instead    Patient was less sedated, calm and communicative with family    family decided to keep old meds and THC oil on top    PT started on Flomax in addition to Proscar    tolerated TOV. Good voiding x 2 days    Cleared for Discharge with a week of levofloxicin 750mg QD    of note, original UC was contaminated. Organism could not culture    Empirically treating for complicated UTI due to chronic oglesby use. 69M from home Parkinsons Dementia, BPH requiring indwelling folley, Gout, Ho of Proteus Mirabilis UTI with Sepsis    Admit [id number] for Chills,Cloudy urine, UTI, Sepsis with shock    Slightly hypotensive on admission    Came to medical floor    Required additional saline boluses    ABX broadened to Vanco/Cefepime    Condition improved    BP stabilized    PT however aggitated, pulling at oglesby    numerous interventions tried, including ativan and    increase in antipsychotic    patient became too sedated    Family elected to try THC oil instead    Patient was less sedated, calm and communicative with family    family decided to keep old meds and THC oil on top    PT started on Flomax in addition to Proscar    tolerated TOV. Good voiding x 2 days    Cleared for Discharge with a week of levofloxicin 750mg QD    of note, original UC was contaminated. Organism could not culture    Empirically treating for complicated UTI due to chronic oglesby use.        Patient was evaluated by home Hospice and accepted.

## 2019-09-27 NOTE — PROGRESS NOTE ADULT - SUBJECTIVE AND OBJECTIVE BOX
Follow-up Pall Progress Note    Neo Bailey MD  [phone]    No new respiratory events overnight.  Denies SOB/CP.     Medications:  Vital Signs Last 24 Hrs  T(C): 36.7 (27 Sep 2019 04:32), Max: 36.9 (26 Sep 2019 15:42)  T(F): 98.1 (27 Sep 2019 04:32), Max: 98.4 (26 Sep 2019 15:42)  HR: 53 (27 Sep 2019 04:32) (53 - 72)  BP: 119/61 (27 Sep 2019 04:32) (100/61 - 119/61)  BP(mean): --  RR: 16 (27 Sep 2019 04:32) (16 - 16)  SpO2: 100% (27 Sep 2019 00:23) (95% - 100%)          09-26 @ 07:01  -  09-27 @ 07:00  --------------------------------------------------------  IN: 480 mL / OUT: 800 mL / NET: -320 mL        LABS:                        12.0   8.15  )-----------( 165      ( 26 Sep 2019 12:33 )             36.1                     CULTURES:  Culture Results:   No growth (09-25 @ 10:38)  Culture Results:   >=3 organisms. Probable collection contamination. (09-22 @ 13:55)  Culture Results:   No growth to date. (09-22 @ 13:20)  Culture Results:   No growth to date. (09-22 @ 13:20)    Most recent blood culture -- 09-25 @ 10:38   -- -- .Urine Catheterized 09-25 @ 10:38      Physical Examination:  PULM: Clear to auscultation bilaterally, no significant sputum production  CVS: Regular rate and rhythm, no murmurs, rubs, or gallops  ABD: Soft, non-tender  EXT:  No clubbing, cyanosis, or edema

## 2019-09-27 NOTE — PROGRESS NOTE ADULT - SUBJECTIVE AND OBJECTIVE BOX
Subjective   Mentation per baseline  Patient more comfortable  and benign overnight than usual  Objective  Vitals reviewed  Gen NAD  Pulm CTA  CVS RRR  Abdo Soft  Ext Kunz removed    Assessment and plan  69M from home Parkinsons Dementia, BPH requiring indwelling folley, Gout, Ho of Proteus Mirabilis UTI with Sepsis  Admit [id number] for Chills,Cloudy urine, UTI and Sepsis    *Sepsis  Resolved    *Complicated UTI possible from Kunz catheter  SP Flomax added to proscar  Successful TOV yesterday voided x 2  SP 4 days of IV ABX  Switch to oral agent. Prefer Levaquin Q daily  Unfortunately, initial UA contaminated.   Retested UA, but urine sterilized by then   Treating empirically with broad spectrum ABX    Dementia with agitation  Titrated Antipsychotics yesterday  but patient too agitated to take medicine  SP stat dose of 1mg IV ativan  Too sedating  Gave 0.5mg subsequent day  Still to sedating  Family elected to DC ativan and  start home THC oil  Patient comfortable today  SP Hospice eval for severe dementia  Pending dispo      Sacral ulcer  Present before admission  Stage 2.   Frequent turning and reposisiton  Local wound care

## 2019-09-27 NOTE — DISCHARGE NOTE PROVIDER - NSDCCPCAREPLAN_GEN_ALL_CORE_FT
PRINCIPAL DISCHARGE DIAGNOSIS  Diagnosis: Urinary tract infection associated with indwelling urethral catheter, initial encounter  Assessment and Plan of Treatment:       SECONDARY DISCHARGE DIAGNOSES  Diagnosis: Dehydration  Assessment and Plan of Treatment:

## 2019-09-28 PROCEDURE — 99232 SBSQ HOSP IP/OBS MODERATE 35: CPT

## 2019-09-28 RX ADMIN — Medication 100 MILLIGRAM(S): at 11:30

## 2019-09-28 RX ADMIN — RIVASTIGMINE 1 PATCH: 4.6 PATCH, EXTENDED RELEASE TRANSDERMAL at 19:00

## 2019-09-28 RX ADMIN — SERTRALINE 50 MILLIGRAM(S): 25 TABLET, FILM COATED ORAL at 11:30

## 2019-09-28 RX ADMIN — ENOXAPARIN SODIUM 40 MILLIGRAM(S): 100 INJECTION SUBCUTANEOUS at 11:30

## 2019-09-28 RX ADMIN — QUETIAPINE FUMARATE 50 MILLIGRAM(S): 200 TABLET, FILM COATED ORAL at 11:31

## 2019-09-28 RX ADMIN — Medication 1 DROP(S): at 17:07

## 2019-09-28 RX ADMIN — CARBIDOPA AND LEVODOPA 1 TABLET(S): 25; 100 TABLET ORAL at 13:46

## 2019-09-28 RX ADMIN — Medication 1 DROP(S): at 05:45

## 2019-09-28 RX ADMIN — CARBIDOPA AND LEVODOPA 1 TABLET(S): 25; 100 TABLET ORAL at 17:07

## 2019-09-28 RX ADMIN — RIVASTIGMINE 1 PATCH: 4.6 PATCH, EXTENDED RELEASE TRANSDERMAL at 07:00

## 2019-09-28 RX ADMIN — RIVASTIGMINE 1 PATCH: 4.6 PATCH, EXTENDED RELEASE TRANSDERMAL at 05:46

## 2019-09-28 RX ADMIN — CARBIDOPA AND LEVODOPA 1 TABLET(S): 25; 100 TABLET ORAL at 11:29

## 2019-09-28 RX ADMIN — Medication 1 APPLICATION(S): at 05:46

## 2019-09-28 RX ADMIN — FINASTERIDE 5 MILLIGRAM(S): 5 TABLET, FILM COATED ORAL at 11:30

## 2019-09-28 RX ADMIN — TAMSULOSIN HYDROCHLORIDE 0.8 MILLIGRAM(S): 0.4 CAPSULE ORAL at 23:31

## 2019-09-28 RX ADMIN — CARBIDOPA AND LEVODOPA 1 TABLET(S): 25; 100 TABLET ORAL at 23:31

## 2019-09-28 NOTE — PROGRESS NOTE ADULT - SUBJECTIVE AND OBJECTIVE BOX
Follow-up Pall Progress Note    Neo Bailey MD  [phone]    Pts family now unsure iff they can take care of him at home.  DC plan was to dc home with hospice.  Now they are looking into LTC facility    Medications:  Vital Signs Last 24 Hrs  T(C): 36.9 (28 Sep 2019 04:30), Max: 36.9 (28 Sep 2019 04:30)  T(F): 98.4 (28 Sep 2019 04:30), Max: 98.4 (28 Sep 2019 04:30)  HR: 69 (28 Sep 2019 04:30) (67 - 93)  BP: 105/64 (28 Sep 2019 04:30) (100/53 - 109/54)  BP(mean): --  RR: 15 (28 Sep 2019 04:30) (15 - 16)  SpO2: 93% (28 Sep 2019 04:30) (93% - 98%)          09-27 @ 07:01  -  09-28 @ 07:00  --------------------------------------------------------  IN: 1000 mL / OUT: 0 mL / NET: 1000 mL        LABS:                        12.0   8.15  )-----------( 165      ( 26 Sep 2019 12:33 )             36.1                     CULTURES:  Culture Results:   No growth (09-25 @ 10:38)  Culture Results:   >=3 organisms. Probable collection contamination. (09-22 @ 13:55)  Culture Results:   No growth at 5 days. (09-22 @ 13:20)  Culture Results:   No growth at 5 days. (09-22 @ 13:20)    Most recent blood culture -- 09-25 @ 10:38   -- -- .Urine Catheterized 09-25 @ 10:38      Physical Examination:  PULM: Clear to auscultation bilaterally, no significant sputum production  CVS: Regular rate and rhythm, no murmurs, rubs, or gallops  ABD: Soft, non-tender  EXT:  No clubbing, cyanosis, or edema    RADIOLOGY REVIEWED  CXR:    CT chest:    TTE:

## 2019-09-28 NOTE — PROGRESS NOTE ADULT - SUBJECTIVE AND OBJECTIVE BOX
Subjective   Mentation per baseline  Patient comfortable and benign overnight  Objective  Vitals reviewed  Gen NAD  Pulm CTA  CVS RRR  Abdo Soft  Ext Kunz removed    Assessment and plan  69M from home Parkinsons Dementia, BPH requiring indwelling folley, Gout, Ho of Proteus Mirabilis UTI with Sepsis  Admit [id number] for Chills,Cloudy urine, UTI and Sepsis    *Sepsis  Resolved    *Complicated UTI possible from Kunz catheter  SP Flomax added to proscar  Successful TOV yesterday voided x 2  SP 4 days of IV ABX  Switched to oral agent Levaquin Q daily yesterday  Unfortunately, initial UA contaminated.   Retested UA, but urine sterilized by then   Treating empirically with broad spectrum ABX    Dementia with agitation  Titrated Antipsychotics on this admission   but patient too agitated to take medicine  Ativan attempted   Too sedating  Gave 0.5mg subsequent day  Still to sedating  Family elected to DC ativan and  start home THC oil  Patient comfortable today  SP Hospice eval for severe dementia  Pending dispo      Sacral ulcer  Present before admission  Stage 2.   Frequent turning and repositioning  Local wound care

## 2019-09-28 NOTE — CHART NOTE - NSCHARTNOTEFT_GEN_A_CORE
Nutrition Follow Up Note  Hospital Course   (Per Electronic Medical Record):     Source:   Patient [X]  Medical Record [X]      Diet:   Puree (Dysphagia 1) Diet w/ Honey Thick Liquids  Tolerates Diet Well - Per Observation @Breakfast  No Chewing/Swallowing Difficulties   Consumes 0-25% of Meals (as Per Documentation)     on Ensure Enlive 8oz PO BID (Provides 700kcal & 40grams of Protein)  on Ensure Pudding 4oz PO BID(Provides 340kcal & 8grams of Protein)   Attempted Nutrition Education on Current Diet/Fluid Consistency   Patient w/ Confusion - Pending D/C to Home on Hospice    Enteral/Parenteral Nutrition: N/A    Current Weight: 163.8lb on 9/28  Obtain Weights Daily  Weights Currently Stable @This Time     Pertinent Medications: MEDICATIONS  (STANDING):  allopurinol 100 milliGRAM(s) Oral daily  artificial tears (preservative free) Ophthalmic Solution 1 Drop(s) Both EYES two times a day  carbidopa/levodopa  25/100 1 Tablet(s) Oral <User Schedule>  carbidopa/levodopa CR 50/200 1 Tablet(s) Oral <User Schedule>  collagenase Ointment 1 Application(s) Topical two times a day  enoxaparin Injectable 40 milliGRAM(s) SubCutaneous daily  finasteride 5 milliGRAM(s) Oral daily  levoFLOXacin  Tablet 750 milliGRAM(s) Oral every 24 hours  QUEtiapine 50 milliGRAM(s) Oral daily  rivastigmine patch  4.6 mG/24 Hr(s) 1 Patch Transdermal <User Schedule>  sertraline 50 milliGRAM(s) Oral daily  tamsulosin 0.8 milliGRAM(s) Oral at bedtime    MEDICATIONS  (PRN):  acetaminophen   Tablet .. 650 milliGRAM(s) Oral every 6 hours PRN Mild Pain (1 - 3)    Pertinent Labs:   09-25 Alb 2.6 g/dL<L>    Skin: Multiple Stage 2 Pressure Ulcers Bilat. Buttocks    Edema: None Noted     Last Bowel Movement: on 9/28    Estimated Needs:   [X] No Change Since Previous Assessment    Previous Nutrition Diagnosis:   Severe Malnutrition    Nutrition Diagnosis is [X] Ongoing -  Continues on Nutrition Supplement     New Nutrition Diagnosis: [X] Not Applicable    Interventions:   1. Recommend Continue Nutrition Plan of Care     Monitoring & Evaluation:   [X] Weights   [X] PO Intake   [X] Follow Up (Per Protocol)  [X] Tolerance to Diet Prescription   [X] Other: Labs     Registered Dietitian/Nutritionist Remains Available.  Phi Artis RDN    Pager # [phone]  Phone# [phone]

## 2019-09-29 PROCEDURE — 99232 SBSQ HOSP IP/OBS MODERATE 35: CPT

## 2019-09-29 RX ADMIN — CARBIDOPA AND LEVODOPA 1 TABLET(S): 25; 100 TABLET ORAL at 12:31

## 2019-09-29 RX ADMIN — Medication 1 APPLICATION(S): at 05:21

## 2019-09-29 RX ADMIN — RIVASTIGMINE 1 PATCH: 4.6 PATCH, EXTENDED RELEASE TRANSDERMAL at 18:10

## 2019-09-29 RX ADMIN — Medication 100 MILLIGRAM(S): at 12:31

## 2019-09-29 RX ADMIN — RIVASTIGMINE 1 PATCH: 4.6 PATCH, EXTENDED RELEASE TRANSDERMAL at 07:40

## 2019-09-29 RX ADMIN — Medication 1 DROP(S): at 05:21

## 2019-09-29 RX ADMIN — RIVASTIGMINE 1 PATCH: 4.6 PATCH, EXTENDED RELEASE TRANSDERMAL at 05:18

## 2019-09-29 RX ADMIN — QUETIAPINE FUMARATE 50 MILLIGRAM(S): 200 TABLET, FILM COATED ORAL at 12:32

## 2019-09-29 RX ADMIN — FINASTERIDE 5 MILLIGRAM(S): 5 TABLET, FILM COATED ORAL at 12:32

## 2019-09-29 RX ADMIN — SERTRALINE 50 MILLIGRAM(S): 25 TABLET, FILM COATED ORAL at 12:32

## 2019-09-29 RX ADMIN — CARBIDOPA AND LEVODOPA 1 TABLET(S): 25; 100 TABLET ORAL at 09:06

## 2019-09-29 RX ADMIN — CARBIDOPA AND LEVODOPA 1 TABLET(S): 25; 100 TABLET ORAL at 21:08

## 2019-09-29 RX ADMIN — TAMSULOSIN HYDROCHLORIDE 0.8 MILLIGRAM(S): 0.4 CAPSULE ORAL at 21:08

## 2019-09-29 RX ADMIN — CARBIDOPA AND LEVODOPA 1 TABLET(S): 25; 100 TABLET ORAL at 15:59

## 2019-09-29 RX ADMIN — Medication 1 APPLICATION(S): at 18:53

## 2019-09-29 RX ADMIN — ENOXAPARIN SODIUM 40 MILLIGRAM(S): 100 INJECTION SUBCUTANEOUS at 12:32

## 2019-09-29 RX ADMIN — RIVASTIGMINE 1 PATCH: 4.6 PATCH, EXTENDED RELEASE TRANSDERMAL at 05:19

## 2019-09-29 NOTE — CONSULT NOTE ADULT - ASSESSMENT
no open wound of sacrococcyx  -routine skin care  -turn and reposition every 2 hours due to limited mobility  -pt's for discharge to home hospice
Palliative:  Elderly male with advanced dementia and pd with recurrent utis and failure to thrive.  Home hospice appropriate.  Will review with family today    PLAN:  Hospice evaluation pending
Recent hospitalization for UTI and retention. Now admitted with dehydration cloudy urine and likely recurrent UTI. Oglesby clear with hydration.    PLAN:    - follow labs and cultures  - continue hydration  - continue abx as per culture results  - do not remove oglesby catheter

## 2019-09-29 NOTE — CONSULT NOTE ADULT - SUBJECTIVE AND OBJECTIVE BOX
HPI:  69M h/o Parkinson's dementia, BPH, h/o gout BIBEMS from home for 2 days of generalized weakness associated with chills and foul smelling cloudy urine in oglesby. 1 week ago pt was admitted to [facility] for UTI and oglesby was placed for urinary retention. Since discharge from [place], pt has worsening generalized weakness, decreased po intake and now unable to ambulate with walk due to weakness. Also developed a bed sore. No fevers. + chills. Reportedly has had 3-4 UTI's this past two years. Admitted for abx and hydration.     Oglesby changed in ED.    He was discharged from [facility] without any PO abx.      PAST MEDICAL & SURGICAL HISTORY:  Gout  Osteoarthritis of spine, unspecified spinal osteoarthritis complication status, unspecified spinal region  Prostatic hyperplasia, benign localized  Dry eyes, bilateral  Sleep apnea, unspecified type: no cpap  Chronic UTI (urinary tract infection)  Traumatic injury of head, initial encounter: Evaluated in ED at [facility].  Received 8 staples to the head.  Kidney calculi: left kidney  Dementia due to Parkinson's disease with behavioral disturbance  Parkinson disease  History of removal of staples  H/O inguinal hernia: left groin, 14 years ago  H/O lithotripsy: 15 years ago      REVIEW OF SYSTEMS:    CONSTITUTIONAL:  no fever  RESPIRATORY: No shortness of breath  CARDIOVASCULAR: No chest pain  GASTROINTESTINAL: No abdominal or epigastric pain.      MEDICATIONS  (STANDING):  allopurinol 100 milliGRAM(s) Oral daily  artificial tears (preservative free) Ophthalmic Solution 1 Drop(s) Both EYES two times a day  carbidopa/levodopa  25/100 1 Tablet(s) Oral <User Schedule>  carbidopa/levodopa CR 50/200 1 Tablet(s) Oral <User Schedule>  cefTRIAXone   IVPB 1000 milliGRAM(s) IV Intermittent every 24 hours  collagenase Ointment 1 Application(s) Topical two times a day  enoxaparin Injectable 40 milliGRAM(s) SubCutaneous daily  finasteride 5 milliGRAM(s) Oral daily  multivitamin 1 Tablet(s) Oral daily  QUEtiapine 25 milliGRAM(s) Oral at bedtime  rivastigmine patch  4.6 mG/24 Hr(s) 1 Patch Transdermal <User Schedule>  sertraline 50 milliGRAM(s) Oral daily    MEDICATIONS  (PRN):  acetaminophen   Tablet .. 650 milliGRAM(s) Oral every 6 hours PRN Mild Pain (1 - 3)      Allergies    iodine containing compounds (Hives; Swelling; Joint Pain)  sulfa drugs (Swelling)      SOCIAL HISTORY: No illicit drug use    FAMILY HISTORY:  Family history of breast cancer  Family history of CHF (congestive heart failure)  Family history of diabetes mellitus      Vital Signs Last 24 Hrs  T(C): 36.4 (23 Sep 2019 04:50), Max: 37.3 (22 Sep 2019 13:20)  T(F): 97.5 (23 Sep 2019 04:50), Max: 99.1 (22 Sep 2019 13:20)  HR: 60 (22 Sep 2019 21:09) (58 - 74)  BP: 111/76      PHYSICAL EXAM:    Constitutional: NAD  Respiratory: No accessory respiratory muscle use  Abd: Soft, NT/ND  : Bladder NT. Oglesby clear.        19 @ 07:01  -  19 @ 07:00  --------------------------------------------------------  IN:    Solution: 50 mL  Total IN: 50 mL    OUT:    Indwelling Catheter - Urethral: 1450 mL  Total OUT: 1450 mL    Total NET: -1400 mL          LABS:                        13.4   11.39 )-----------( 196      ( 23 Sep 2019 07:55 )             42.2         145  |  108  |  16  ----------------------------<  102<H>  4.3   |  30  |  0.85    Ca    8.9      23 Sep 2019 07:55  Mg     1.9         TPro  7.6  /  Alb  3.0<L>  /  TBili  0.4  /  DBili  x   /  AST  52<H>  /  ALT  29  /  AlkPhos  76      PT/INR - ( 22 Sep 2019 13:20 )   PT: 12.8 sec;   INR: 1.14 ratio         PTT - ( 22 Sep 2019 13:20 )  PTT:28.7 sec  Urinalysis Basic - ( 22 Sep 2019 13:55 )    Color: Yellow / Appearance: Clear / S.015 / pH: x  Gluc: x / Ketone: Negative  / Bili: Negative / Urobili: Negative   Blood: x / Protein: 100 / Nitrite: Positive   Leuk Esterase: Moderate / RBC: >50 /HPF / WBC >50 /HPF   Sq Epi: x / Non Sq Epi: Neg.-Few / Bacteria: Many /HPF
HPI:  69M h/o Parkinson's dementia, BPH, h/o gout BIBEMS from home for 2 days of generalized weakness associated with chills and foul smelling cloudy urine in oglesby. 1 week ago pt was admitted to [facility] for UTI and oglesby was started for urinary retention. Since discharge from [place], pt has worsening generalized weakness, decreased po intake and now unable to ambulate with walker due to weakness. Wife of pt also mentioned that pt developed a bed sore. No fevers. + chills. No chest pain or sob. No nausea or vomiting, abdominal pain, or dysuria. Pt's wife states that pt had 3-4 episodes of UTI this past two years.  Palliative;  Case reviewed with wife and Dr Drummond  Afebrile in ED, VS stable. Lactic acid of 2.8.  Oglesby changed in ED.  UA positive, s/p rocephin 1g X1. (22 Sep 2019 15:14)      PAST MEDICAL & SURGICAL HISTORY:  Gout  Osteoarthritis of spine, unspecified spinal osteoarthritis complication status, unspecified spinal region  Prostatic hyperplasia, benign localized  Dry eyes, bilateral  Sleep apnea, unspecified type: no cpap  Chronic UTI (urinary tract infection)  Traumatic injury of head, initial encounter: Evaluated in ED at [facility].  Received 8 staples to the head.  Kidney calculi: left kidney  Dementia due to Parkinson's disease with behavioral disturbance  Parkinson disease  History of removal of staples  H/O inguinal hernia: left groin, 14 years ago  H/O lithotripsy: 15 years ago      SOCIAL HISTORY:    Admitted from:  home   Substance abuse history:   No significant           Tobacco hx:                  Alcohol hx:              Home Opioid hx:  Surrogate/HCP/Guardian: Cynthia Salinas           Phone#: [phone]    FAMILY HISTORY:  Family history of breast cancer  Family history of CHF (congestive heart failure)  Family history of diabetes mellitus    Baseline ADLs (prior to admission):    Allergies    iodine containing compounds (Hives; Swelling; Joint Pain)  sulfa drugs (Swelling)    Intolerances      Present Symptoms:   Dyspnea: no  Nausea/Vomiting: no  Anxiety:no  Depressed no  Fatigue: no    Pain:  no apparent                              location:          Review of Systems: [All others negative or Unable to obtain due to poor mentation]    MEDICATIONS  (STANDING):  allopurinol 100 milliGRAM(s) Oral daily  artificial tears (preservative free) Ophthalmic Solution 1 Drop(s) Both EYES two times a day  carbidopa/levodopa  25/100 1 Tablet(s) Oral <User Schedule>  carbidopa/levodopa CR 50/200 1 Tablet(s) Oral <User Schedule>  cefepime   IVPB 2000 milliGRAM(s) IV Intermittent two times a day  collagenase Ointment 1 Application(s) Topical two times a day  enoxaparin Injectable 40 milliGRAM(s) SubCutaneous daily  finasteride 5 milliGRAM(s) Oral daily  QUEtiapine 50 milliGRAM(s) Oral daily  rivastigmine patch  4.6 mG/24 Hr(s) 1 Patch Transdermal <User Schedule>  sertraline 50 milliGRAM(s) Oral daily  tamsulosin 0.8 milliGRAM(s) Oral at bedtime  vancomycin  IVPB 1000 milliGRAM(s) IV Intermittent every 12 hours    MEDICATIONS  (PRN):  acetaminophen   Tablet .. 650 milliGRAM(s) Oral every 6 hours PRN Mild Pain (1 - 3)  LORazepam   Injectable 0.5 milliGRAM(s) IV Push every 12 hours PRN Agitation      PHYSICAL EXAM:    Vital Signs Last 24 Hrs  T(C): 37.2 (25 Sep 2019 05:00), Max: 37.7 (24 Sep 2019 21:11)  T(F): 99 (25 Sep 2019 05:00), Max: 99.8 (24 Sep 2019 21:11)  HR: 55 (25 Sep 2019 05:00) (53 - 56)  BP: 141/70 (25 Sep 2019 05:00) (127/66 - 144/62)  BP(mean): --  RR: 15 (25 Sep 2019 05:00) (15 - 15)  SpO2: 94% (25 Sep 2019 05:00) (94% - 96%)    General: lethargic   HEENT: normal    Lungs: comfortable  CV: normal    GI: normal    : normal   Musculoskeletal: profound weakness    Skin: normal    Neuro: cognitive impairment   Oral intake ability: lethargic  Diet: [NPO]    LABS:                        13.1   11.62 )-----------( 178      ( 25 Sep 2019 08:15 )             40.4     09-25    144  |  108  |  12  ----------------------------<  105<H>  3.8   |  26  |  0.95    Ca    8.6      25 Sep 2019 08:15    TPro  6.7  /  Alb  2.6<L>  /  TBili  0.8  /  DBili  x   /  AST  17  /  ALT  22  /  AlkPhos  68  09-25        RADIOLOGY & ADDITIONAL STUDIES:    ADVANCE DIRECTIVES: DIGNA on chart  Advanced Care Planning discussion total time spent:
CC:  Patient is a 69y old  Male who presents with a chief complaint of generalized weakness and chills (28 Sep 2019 09:51)      HPI:  69M h/o Parkinson's dementia, BPH, h/o gout BIBEMS from home for 2 days of generalized weakness associated with chills and foul smelling cloudy urine in oglesby. 1 week ago pt was admitted to [facility] for UTI and oglesby was started for urinary retention. Since discharge from [place], pt has worsening generalized weakness, decreased po intake and now unable to ambulate with walker due to weakness. Wife of pt also mentioned that pt developed a bed sore. No fevers. + chills. No chest pain or sob. No nausea or vomiting, abdominal pain, or dysuria. Pt's wife states that pt had 3-4 episodes of UTI this past two years.     Afebrile in ED, VS stable. Lactic acid of 2.8.  Oglesby changed in ED.  UA positive, s/p rocephin 1g X1. (22 Sep 2019 15:14)      PAST MEDICAL & SURGICAL HISTORY:  Gout  Osteoarthritis of spine, unspecified spinal osteoarthritis complication status, unspecified spinal region  Prostatic hyperplasia, benign localized  Dry eyes, bilateral  Sleep apnea, unspecified type: no cpap  Chronic UTI (urinary tract infection)  Traumatic injury of head, initial encounter: Evaluated in ED at [facility].  Received 8 staples to the head.  Kidney calculi: left kidney  Dementia due to Parkinson's disease with behavioral disturbance  Parkinson disease  History of removal of staples  H/O inguinal hernia: left groin, 14 years ago  H/O lithotripsy: 15 years ago      Allergies    iodine containing compounds (Hives; Swelling; Joint Pain)  sulfa drugs (Swelling)    Intolerances        SOCIAL HISTORY          Smoking: Yes [ ]  No [ ]   ______pk yrs          ETOH  Yes [ ]  No [ ]  Social [ ]          DRUGS:  Yes [ ]  No [ ]  if so what______________    FAMILY HISTORY:  Family history of breast cancer  Family history of CHF (congestive heart failure)  Family history of diabetes mellitus      MEDICATIONS  (STANDING):  allopurinol 100 milliGRAM(s) Oral daily  artificial tears (preservative free) Ophthalmic Solution 1 Drop(s) Both EYES two times a day  carbidopa/levodopa  25/100 1 Tablet(s) Oral <User Schedule>  carbidopa/levodopa CR 50/200 1 Tablet(s) Oral <User Schedule>  collagenase Ointment 1 Application(s) Topical two times a day  enoxaparin Injectable 40 milliGRAM(s) SubCutaneous daily  finasteride 5 milliGRAM(s) Oral daily  levoFLOXacin  Tablet 750 milliGRAM(s) Oral every 24 hours  QUEtiapine 50 milliGRAM(s) Oral daily  rivastigmine patch  4.6 mG/24 Hr(s) 1 Patch Transdermal <User Schedule>  sertraline 50 milliGRAM(s) Oral daily  tamsulosin 0.8 milliGRAM(s) Oral at bedtime    MEDICATIONS  (PRN):  acetaminophen   Tablet .. 650 milliGRAM(s) Oral every 6 hours PRN Mild Pain (1 - 3)         Review of systems:  General:  no fever or chills  Pulmonary:  no SOB  Cardiac:  denies chest pain, palpitations  GI:  no nausea, vomitting, or abddominal pain  :  no increase frequency, or burning  Heme:  no easy bruising with minor trauma  Musculoskeletal:  No history of back pain or trauma  Skin:  no history of rashes, injury, trauma  Neuro:    weakness         Vital Signs Last 24 Hrs  T(C): 36.8 (29 Sep 2019 05:53), Max: 37.2 (28 Sep 2019 19:00)  T(F): 98.2 (29 Sep 2019 05:53), Max: 99 (28 Sep 2019 19:00)  HR: 62 (29 Sep 2019 05:53) (62 - 70)  BP: 129/70 (29 Sep 2019 05:53) (103/62 - 129/70)  BP(mean): --  RR: 14 (29 Sep 2019 05:53) (14 - 15)  SpO2: 98% (29 Sep 2019 05:53) (97% - 98%)    Physical Exam:    General:    no distress     Skin:   pt positioned in right lateral decubitus position, barrier cream coccyx remove, no ulcer, suggestion of healed ulcer left and right medial buttocks         LABS:                RADIOLOGY & ADDITIONAL STUDIES:    Risks, benefits, and alternatives to treatment discussed. All questions answered with understanding.    Procedure Performed:  (  )Yes     ( x )No  Name of Procedure:      [  ]Debridement     [  ]I&D    [  ]Laceration Repair     [  ]Other:  (  )partial thickness     (  )full thickness     (  )subcutaneous     (  )muscle/tendon     (  )bone  (  )sharp     (  )surgical

## 2019-09-29 NOTE — PROGRESS NOTE ADULT - SUBJECTIVE AND OBJECTIVE BOX
Subjective   Mentation per baseline  Patient comfortable and benign overnight  Objective  Vitals reviewed  Gen NAD  Pulm CTA  CVS RRR  Abdo Soft  Ext Kunz removed    Assessment and plan  69M from home Parkinsons Dementia, BPH requiring indwelling folley, Gout, Ho of Proteus Mirabilis UTI with Sepsis  Admit [facility] for Chills,Cloudy urine, UTI and Sepsis    *Sepsis  Resolved    *Complicated UTI possible from Kunz catheter  SP Flomax added to proscar  Successful TOV yesterday voided x 2  SP 4 days of IV ABX  Switched to oral agent Levaquin Q daily 2 days ago  Will continue for 5 more days  Unfortunately, initial UA contaminated.   Retested UA, but urine sterilized by then   Treating empirically with broad spectrum ABX    Dementia with agitation  Titrated Antipsychotics on this admission   but patient too agitated to take medicine  Ativan attempted   Too sedating  Gave 0.5mg subsequent day  Still to sedating  Family elected to DC ativan and  started home THC oil  Patient comfortable x 2 days now on THC oil  SP Hospice eval for severe dementia  Pending dispo      Sacral ulcer  Present before admission  Stage 2.   Frequent turning and repositioning  Local wound care

## 2019-09-30 ENCOUNTER — TRANSCRIPTION ENCOUNTER (OUTPATIENT)
Age: 69
End: 2019-09-30

## 2019-09-30 VITALS
SYSTOLIC BLOOD PRESSURE: 127 MMHG | DIASTOLIC BLOOD PRESSURE: 70 MMHG | WEIGHT: 162.26 LBS | RESPIRATION RATE: 14 BRPM | TEMPERATURE: 99 F | HEART RATE: 60 BPM | OXYGEN SATURATION: 96 %

## 2019-09-30 DIAGNOSIS — G20 PARKINSON'S DISEASE: ICD-10-CM

## 2019-09-30 DIAGNOSIS — M1A.0790 IDIOPATHIC CHRONIC GOUT, UNSPECIFIED ANKLE AND FOOT, WITHOUT TOPHUS (TOPHI): ICD-10-CM

## 2019-09-30 DIAGNOSIS — L89.152 PRESSURE ULCER OF SACRAL REGION, STAGE 2: ICD-10-CM

## 2019-09-30 DIAGNOSIS — N40.0 BENIGN PROSTATIC HYPERPLASIA WITHOUT LOWER URINARY TRACT SYMPTOMS: ICD-10-CM

## 2019-09-30 DIAGNOSIS — T83.511A INFECTION AND INFLAMMATORY REACTION DUE TO INDWELLING URETHRAL CATHETER, INITIAL ENCOUNTER: ICD-10-CM

## 2019-09-30 PROCEDURE — 87040 BLOOD CULTURE FOR BACTERIA: CPT

## 2019-09-30 PROCEDURE — 86803 HEPATITIS C AB TEST: CPT

## 2019-09-30 PROCEDURE — 93005 ELECTROCARDIOGRAM TRACING: CPT

## 2019-09-30 PROCEDURE — 36415 COLL VENOUS BLD VENIPUNCTURE: CPT

## 2019-09-30 PROCEDURE — 83605 ASSAY OF LACTIC ACID: CPT

## 2019-09-30 PROCEDURE — 82962 GLUCOSE BLOOD TEST: CPT

## 2019-09-30 PROCEDURE — 85730 THROMBOPLASTIN TIME PARTIAL: CPT

## 2019-09-30 PROCEDURE — 97161 PT EVAL LOW COMPLEX 20 MIN: CPT

## 2019-09-30 PROCEDURE — 71045 X-RAY EXAM CHEST 1 VIEW: CPT

## 2019-09-30 PROCEDURE — 85027 COMPLETE CBC AUTOMATED: CPT

## 2019-09-30 PROCEDURE — 99285 EMERGENCY DEPT VISIT HI MDM: CPT | Mod: 25

## 2019-09-30 PROCEDURE — 83735 ASSAY OF MAGNESIUM: CPT

## 2019-09-30 PROCEDURE — 80053 COMPREHEN METABOLIC PANEL: CPT

## 2019-09-30 PROCEDURE — 80202 ASSAY OF VANCOMYCIN: CPT

## 2019-09-30 PROCEDURE — 81001 URINALYSIS AUTO W/SCOPE: CPT

## 2019-09-30 PROCEDURE — 80048 BASIC METABOLIC PNL TOTAL CA: CPT

## 2019-09-30 PROCEDURE — 87086 URINE CULTURE/COLONY COUNT: CPT

## 2019-09-30 PROCEDURE — 99239 HOSP IP/OBS DSCHRG MGMT >30: CPT

## 2019-09-30 PROCEDURE — 83615 LACTATE (LD) (LDH) ENZYME: CPT

## 2019-09-30 PROCEDURE — 85610 PROTHROMBIN TIME: CPT

## 2019-09-30 PROCEDURE — 96365 THER/PROPH/DIAG IV INF INIT: CPT

## 2019-09-30 RX ADMIN — RIVASTIGMINE 1 PATCH: 4.6 PATCH, EXTENDED RELEASE TRANSDERMAL at 05:06

## 2019-09-30 RX ADMIN — RIVASTIGMINE 1 PATCH: 4.6 PATCH, EXTENDED RELEASE TRANSDERMAL at 07:17

## 2019-09-30 RX ADMIN — QUETIAPINE FUMARATE 50 MILLIGRAM(S): 200 TABLET, FILM COATED ORAL at 11:54

## 2019-09-30 RX ADMIN — Medication 100 MILLIGRAM(S): at 11:54

## 2019-09-30 RX ADMIN — Medication 1 APPLICATION(S): at 05:07

## 2019-09-30 RX ADMIN — RIVASTIGMINE 1 PATCH: 4.6 PATCH, EXTENDED RELEASE TRANSDERMAL at 05:00

## 2019-09-30 RX ADMIN — CARBIDOPA AND LEVODOPA 1 TABLET(S): 25; 100 TABLET ORAL at 07:36

## 2019-09-30 RX ADMIN — ENOXAPARIN SODIUM 40 MILLIGRAM(S): 100 INJECTION SUBCUTANEOUS at 11:54

## 2019-09-30 RX ADMIN — CARBIDOPA AND LEVODOPA 1 TABLET(S): 25; 100 TABLET ORAL at 11:54

## 2019-09-30 RX ADMIN — Medication 1 DROP(S): at 05:07

## 2019-09-30 RX ADMIN — FINASTERIDE 5 MILLIGRAM(S): 5 TABLET, FILM COATED ORAL at 11:54

## 2019-09-30 RX ADMIN — SERTRALINE 50 MILLIGRAM(S): 25 TABLET, FILM COATED ORAL at 11:54

## 2019-09-30 NOTE — PROGRESS NOTE ADULT - PROVIDER SPECIALTY LIST ADULT
Hospitalist
Internal Medicine
Palliative Care
Urology
Hospitalist

## 2019-09-30 NOTE — PROGRESS NOTE ADULT - SUBJECTIVE AND OBJECTIVE BOX
Patient is a 69y old  Male who presents with a chief complaint of generalized weakness and chills (29 Sep 2019 13:37)      Patient seen and examined at bedside.    ALLERGIES:  iodine containing compounds (Hives; Swelling; Joint Pain)  sulfa drugs (Swelling)    MEDICATIONS  (STANDING):  allopurinol 100 milliGRAM(s) Oral daily  artificial tears (preservative free) Ophthalmic Solution 1 Drop(s) Both EYES two times a day  carbidopa/levodopa  25/100 1 Tablet(s) Oral <User Schedule>  carbidopa/levodopa CR 50/200 1 Tablet(s) Oral <User Schedule>  collagenase Ointment 1 Application(s) Topical two times a day  enoxaparin Injectable 40 milliGRAM(s) SubCutaneous daily  finasteride 5 milliGRAM(s) Oral daily  levoFLOXacin  Tablet 750 milliGRAM(s) Oral every 24 hours  QUEtiapine 50 milliGRAM(s) Oral daily  rivastigmine patch  4.6 mG/24 Hr(s) 1 Patch Transdermal <User Schedule>  sertraline 50 milliGRAM(s) Oral daily  tamsulosin 0.8 milliGRAM(s) Oral at bedtime    MEDICATIONS  (PRN):  acetaminophen   Tablet .. 650 milliGRAM(s) Oral every 6 hours PRN Mild Pain (1 - 3)    Vital Signs Last 24 Hrs  T(F): 98.7 (30 Sep 2019 04:54), Max: 98.7 (29 Sep 2019 21:00)  HR: 60 (30 Sep 2019 04:54) (60 - 72)  BP: 127/70 (30 Sep 2019 04:54) (105/63 - 127/70)  RR: 14 (30 Sep 2019 04:54) (14 - 14)  SpO2: 96% (30 Sep 2019 04:54) (94% - 98%)  I&O's Summary    29 Sep 2019 07:01  -  30 Sep 2019 07:00  --------------------------------------------------------  IN: 620 mL / OUT: 1 mL / NET: 619 mL        PHYSICAL EXAM:  General: NAD  ENT: MMM  Neck: Supple, No JVD  Lungs: Clear to auscultation bilaterally  Cardio: RRR, S1/S2, No murmurs  Abdomen: Soft, Nontender, Nondistended; Bowel sounds present  Extremities: No calf tenderness, No pitting edema  Sacral decubitus ulcer: redness with skin breakdown     LABS:    Culture - Urine (collected 25 Sep 2019 10:38)  Source: .Urine Catheterized  Final Report (26 Sep 2019 12:24):    No growth    RADIOLOGY & ADDITIONAL TESTS:    Care Discussed with Consultants/Other Providers: YES

## 2019-09-30 NOTE — PROGRESS NOTE ADULT - ASSESSMENT
69M PMH Parkinson's dementia, BPH with indwelling oglesby cathter, gout h/o UTI presents with UTI and sepsis.    Sepsis and UTI resolved   Oglesby removed  voiding successfully  completed antibiotics     dementia with agitation  - off ativan  - on THC oil from home  also on seroquel   - for home hospice     Sacral Decubitus ulcer  - frequent turning  - present on admission    D/c home today with hospice

## 2019-09-30 NOTE — PROGRESS NOTE ADULT - PROBLEM SELECTOR PLAN 2
- flomax 0.8 mg oral at bedtime added to finasteride (proscar) 5mg oral daily  - patient is voiding normally x 2 yesterday

## 2019-09-30 NOTE — PROGRESS NOTE ADULT - PROBLEM SELECTOR PLAN 1
- patient seem stable after ABX  - 4 days of IV ABX was switched to levaquin oral agent 2 days ago, contd for 5 days   - initial UA was contaminated, treating empirically w/broad spectrum as specified above

## 2019-09-30 NOTE — PROGRESS NOTE ADULT - PROBLEM SELECTOR PLAN 3
- patient is agitated  - Antipyschotics was titrated on admission, patient too agitated to take meds  - Ativan was attempted  - family opted to D/C ativan, they want THC oil instead.  - contd carbidopa-levodopa 25-100mg oral 1 tab 3 times daily  - contd rivastigmine 4.6mg 1 patch transdermal once a day  - Hospice evaluation done and needs home hospice care

## 2019-09-30 NOTE — PROGRESS NOTE ADULT - REASON FOR ADMISSION
generalized weakness and chills
generalized weakness and chills/fever

## 2019-09-30 NOTE — PROGRESS NOTE ADULT - SUBJECTIVE AND OBJECTIVE BOX
Patient is a 69y old  Male w/PMHx of parkinson's dementia, BPH w/indwelling Kunz catheter, Gout, UTI, who presents with a chief complaint of generalized weakness and chills (30 Sep 2019 09:54). Labs show cloudy urine but not septic, lactate was elevated but has been resolved. patient probably had infection from previous UTI but SIRS criteria are within normal. previous culture was contaminated.      Patient seen and examined at bedside. appear stable NAD    ALLERGIES:  iodine containing compounds (Hives; Swelling; Joint Pain)  sulfa drugs (Swelling)    MEDICATIONS  (STANDING):  allopurinol 100 milliGRAM(s) Oral daily  artificial tears (preservative free) Ophthalmic Solution 1 Drop(s) Both EYES two times a day  carbidopa/levodopa  25/100 1 Tablet(s) Oral <User Schedule>  carbidopa/levodopa CR 50/200 1 Tablet(s) Oral <User Schedule>  collagenase Ointment 1 Application(s) Topical two times a day  enoxaparin Injectable 40 milliGRAM(s) SubCutaneous daily  finasteride 5 milliGRAM(s) Oral daily  levoFLOXacin  Tablet 750 milliGRAM(s) Oral every 24 hours  QUEtiapine 50 milliGRAM(s) Oral daily  rivastigmine patch  4.6 mG/24 Hr(s) 1 Patch Transdermal <User Schedule>  sertraline 50 milliGRAM(s) Oral daily  tamsulosin 0.8 milliGRAM(s) Oral at bedtime    MEDICATIONS  (PRN):  acetaminophen   Tablet .. 650 milliGRAM(s) Oral every 6 hours PRN Mild Pain (1 - 3)    Vital Signs Last 24 Hrs  T(F): 98.7 (30 Sep 2019 04:54), Max: 98.7 (29 Sep 2019 21:00)  HR: 60 (30 Sep 2019 04:54) (60 - 72)  BP: 127/70 (30 Sep 2019 04:54) (105/63 - 127/70)  RR: 14 (30 Sep 2019 04:54) (14 - 14)  SpO2: 96% (30 Sep 2019 04:54) (94% - 98%)  I&O's Summary    29 Sep 2019 07:01  -  30 Sep 2019 07:00  --------------------------------------------------------  IN: 620 mL / OUT: 1 mL / NET: 619 mL    30 Sep 2019 07:01  -  30 Sep 2019 10:51  --------------------------------------------------------  IN: 400 mL / OUT: 0 mL / NET: 400 mL        PHYSICAL EXAM:  General: NAD, A/O x 3  ENT: MMM  Neck: Supple, No JVD  Lungs: Clear to auscultation bilaterally  Cardio: RRR, S1/S2, No murmurs  Abdomen: Soft, Nontender, Nondistended; Bowel sounds present  Extremities: No calf tenderness, No pitting edema    LABS:                                                RADIOLOGY & ADDITIONAL TESTS:    Care Discussed with Consultants/Other Providers: Patient is a 69y old  Male w/PMHx of parkinson's dementia, BPH w/indwelling Kunz catheter, Gout, UTI, who presents with a chief complaint of generalized weakness and chills (30 Sep 2019 09:54). Labs show cloudy urine but not septic, lactate was elevated but has been resolved. patient probably had infection from previous UTI but SIRS criteria are within normal. previous culture was contaminated.      Patient seen and examined at bedside. appear stable NAD    ALLERGIES:  iodine containing compounds (Hives; Swelling; Joint Pain)  sulfa drugs (Swelling)    MEDICATIONS  (STANDING):  allopurinol 100 milliGRAM(s) Oral daily  artificial tears (preservative free) Ophthalmic Solution 1 Drop(s) Both EYES two times a day  carbidopa/levodopa  25/100 1 Tablet(s) Oral <User Schedule>  carbidopa/levodopa CR 50/200 1 Tablet(s) Oral <User Schedule>  collagenase Ointment 1 Application(s) Topical two times a day  enoxaparin Injectable 40 milliGRAM(s) SubCutaneous daily  finasteride 5 milliGRAM(s) Oral daily  levoFLOXacin  Tablet 750 milliGRAM(s) Oral every 24 hours  QUEtiapine 50 milliGRAM(s) Oral daily  rivastigmine patch  4.6 mG/24 Hr(s) 1 Patch Transdermal <User Schedule>  sertraline 50 milliGRAM(s) Oral daily  tamsulosin 0.8 milliGRAM(s) Oral at bedtime    MEDICATIONS  (PRN):  acetaminophen   Tablet .. 650 milliGRAM(s) Oral every 6 hours PRN Mild Pain (1 - 3)    Vital Signs Last 24 Hrs  T(F): 98.7 (30 Sep 2019 04:54), Max: 98.7 (29 Sep 2019 21:00)  HR: 60 (30 Sep 2019 04:54) (60 - 72)  BP: 127/70 (30 Sep 2019 04:54) (105/63 - 127/70)  RR: 14 (30 Sep 2019 04:54) (14 - 14)  SpO2: 96% (30 Sep 2019 04:54) (94% - 98%)  I&O's Summary    29 Sep 2019 07:01  -  30 Sep 2019 07:00  --------------------------------------------------------  IN: 620 mL / OUT: 1 mL / NET: 619 mL    30 Sep 2019 07:01  -  30 Sep 2019 10:51  --------------------------------------------------------  IN: 400 mL / OUT: 0 mL / NET: 400 mL        PHYSICAL EXAM:  General: NAD, A/O x 1  ENT: MMM  Neck: Supple, No JVD  Lungs: Clear to auscultation bilaterally  Cardio: RRR, S1/S2, No murmurs  Abdomen: Soft, Nontender, Nondistended; Bowel sounds present  Extremities: No calf tenderness, No pitting edema    LABS:                                                RADIOLOGY & ADDITIONAL TESTS:    Care Discussed with Consultants/Other Providers:

## 2019-09-30 NOTE — ED PROVIDER NOTE - EYES, MLM
----- Message from José Lee MD sent at 9/30/2019 10:46 AM CDT -----  Same and repat in 3-4 weeks  
José Lee MD  You 1 hour ago (12:16 PM)     This week     Routing comment       You routed conversation to ANATOLY Lee Card Nurse Msg Pool 2 hours ago (11:01 AM)      You  José Lee MD 2 hours ago (11:01 AM)      Patient started cipro Sunday and has biliary tube change procedure Wednesday, 10/2/2019. Do you still want to check in 3-4 weeks, or sooner? Thank you    Routing comment      Called patient to convey results of INR 2.8 and recheck INR later this week. Patient verbalized understanding with plan. He is unsure if he will be able to complete INR this Friday pending how he is feeling from his procedure. Advised a call to clinic if he is unable to complete INR as recommended. Patient verbalized understanding and has no further questions at this time.   
Clear bilaterally, pupils equal, round and reactive to light.

## 2019-09-30 NOTE — DISCHARGE NOTE NURSING/CASE MANAGEMENT/SOCIAL WORK - PATIENT PORTAL LINK FT
You can access the FollowMyHealth Patient Portal offered by [facility] by registering at the following website: [email or web address]. By joining [facility]’s FollowMyHealth portal, you will also be able to view your health information using other applications (apps) compatible with our system.

## 2019-09-30 NOTE — PROGRESS NOTE ADULT - ASSESSMENT
Patient is a 69y old  Male w/PMHx of parkinson's dementia, BPH w/indwelling Kunz catheter, Gout, UTI, sacral decubitus who presents with a chief complaint of generalized weakness and chills (30 Sep 2019 09:54). Labs show cloudy urine but not septic, lactate was elevated but has been resolved. patient probably had infection from previous UTI but SIRS criteria are within normal. previous culture was contaminated. patient is being treated empirically with ABX. Patient is a 69y old  Male w/PMHx of parkinson's dementia, BPH w/indwelling Kunz catheter, Gout, UTI, sacral decubitus who presents with a chief complaint of generalized weakness and chills/fever (30 Sep 2019 09:54). Labs show cloudy urine but not septic, lactate was elevated but has been resolved. patient probably had infection from previous UTI but SIRS criteria are within normal. previous culture was contaminated. patient is being treated empirically with ABX.

## 2020-03-30 NOTE — H&P PST ADULT - LAST STRESS TEST
Type 2 Diabetes in Adults   AMBULATORY CARE:   Type 2 diabetes  is a disease that affects how your body uses glucose (sugar)  Normally, when the blood sugar level increases, the pancreas makes more insulin  Insulin helps move sugar out of the blood so it can be used for energy  Type 2 diabetes develops because either the body cannot make enough insulin, or it cannot use the insulin correctly  After many years, your pancreas may stop making insulin  Common symptoms include the following:   · More hunger or thirst than usual     · Frequent urination     · Weight loss without trying     · Blurred vision  Call 911 if you have any of the following:   · You have any of the following signs of a stroke:      ¨ Numbness or drooping on one side of your face     ¨ Weakness in an arm or leg    ¨ Confusion or difficulty speaking    ¨ Dizziness, a severe headache, or vision loss    · You have any of the following signs of a heart attack:      ¨ Squeezing, pressure, or pain in your chest that lasts longer than 5 minutes or returns    ¨ Discomfort or pain in your back, neck, jaw, stomach, or arm     ¨ Trouble breathing    ¨ Nausea or vomiting    ¨ Lightheadedness or a sudden cold sweat, especially with chest pain or trouble breathing  Seek care immediately if:   · You have severe abdominal pain, or the pain spreads to your back  You may also be vomiting  · You have trouble staying awake or focusing  · You are shaking or sweating  · You have blurred or double vision  · Your breath has a fruity, sweet smell  · Your breathing is deep and labored, or rapid and shallow  · Your heartbeat is fast and weak  Contact your healthcare provider if:   · You are vomiting or have diarrhea  · You have an upset stomach and cannot eat the foods on your meal plan  · You feel weak or more tired than usual      · You feel dizzy, have headaches, or are easily irritated  · Your skin is red, warm, dry, or swollen  · You have a wound that does not heal      · You have numbness in your arms or legs  · You have trouble coping with your illness, or you feel anxious or depressed  · You have questions or concerns about your condition or care  Treatment for type 2 diabetes  includes keeping your blood sugar at a normal level  You must eat the right foods, and exercise regularly  You may need medicine if you cannot control your blood sugar level with nutrition and exercise  You may also need medicine to prevent heart disease, a complication of type 2 diabetes  You may  need any of the following:  · Hypoglycemic medicines or insulin  may be given to decrease the amount of sugar in your blood  · Blood pressure medicine  may be given to lower your blood pressure  Your blood pressure should be less than 140/90  · Cholesterol lowering medicine  may be given to prevent heart disease  · Antiplatelets , such as aspirin, help prevent blood clots  Take your antiplatelet medicine exactly as directed  These medicines make it more likely for you to bleed or bruise  If you are told to take aspirin, do not take acetaminophen or ibuprofen instead  · Take your medicine as directed  Contact your healthcare provider if you think your medicine is not helping or if you have side effects  Tell him or her if you are allergic to any medicine  Keep a list of the medicines, vitamins, and herbs you take  Include the amounts, and when and why you take them  Bring the list or the pill bottles to follow-up visits  Carry your medicine list with you in case of an emergency  Check your blood sugar level: You will be taught how to check a small drop of blood in a glucose monitor  You will need to check your blood sugar level at least 3 times each day if you are on insulin  Ask your healthcare provider when and how often to check during the day  If you check your blood sugar level before a meal , it should be between 80 and 130 mg/dL   If you check your blood sugar level 1 to 2 hours after a meal , it should be less than 180 mg/dL  Ask your healthcare provider if these are good goals for you  Write down your results, and show them to your healthcare provider  He may use the results to make changes to your medicine, food, and exercise schedules  If your blood sugar level is too low: Your blood sugar level is too low if it goes below 70 mg/dL  If the level is too low, eat or drink 15 grams of fast-acting carbohydrate  These are found naturally in fruits  Fast-acting carbohydrates will raise your blood sugar level quickly  Examples of 15 grams of fast-acting carbohydrate are 4 ounces (½ cup) of fruit juice or 4 ounces of regular soda  Other examples are 2 tablespoons of raisins or 3 to 4 glucose tablets  Check your blood sugar level 15 minutes later  If the level is still low (less than 100 mg/dL), eat another 15 grams of carbohydrate  When the level returns to 100 mg/dL, eat a snack or meal that contains carbohydrates  This will help prevent another drop in blood sugar  Always carefully follow your healthcare provider's instructions on how to treat low blood sugar levels  Check your feet each day for sores:  Wear shoes and socks that fit correctly  Do not trim your toenails  Ask your healthcare provider for more information about foot care  Follow your meal plan:  A dietitian will help you make a meal plan to keep your blood sugar level steady  Do not skip meals  Your blood sugar level may drop too low if you have taken diabetes medicine and do not eat  · Keep track of carbohydrates (sugar and starchy foods)  Your blood sugar level can get too high if you eat too many carbohydrates  Your dietitian will help you plan meals and snacks that have the right amount of carbohydrates  · Eat low-fat foods , such as skinless chicken and low-fat milk  · Eat less sodium (salt)    Limit high-sodium foods, such as soy sauce, potato chips, and soup  Do not add salt to food you cook  Limit your use of table salt  You should have less than 2,300 mg of sodium per day  · Eat high-fiber foods , such as vegetables, whole grain breads, and beans  · Limit alcohol  Alcohol affects your blood sugar level and can make it harder to manage your diabetes  Limit alcohol to 1 drink a day if you are a woman  Limit alcohol to 2 drinks a day if you are a man  A drink of alcohol is 12 ounces of beer, 5 ounces of wine, or 1½ ounces of liquor  Maintain a healthy weight:  Ask your healthcare provider how much you should weigh  A healthy weight can help you control your diabetes  Ask your provider to help you create a weight loss plan if you are overweight  Together you can set manageable weight loss goals  Exercise as directed:  Exercise can help keep your blood sugar level steady, decrease your risk of heart disease, and help you lose weight  Stretch before and after you exercise  Exercise for at least 150 minutes every week  Spread this amount of exercise over at least 3 days a week  Do not skip exercise more than 2 days in a row  Include muscle strengthening activities 2 to 3 days each week  Older adults should include balance training 2 to 3 times each week  Activities that help increase balance include yoga and cheo chi  Work with your healthcare provider to create an exercise plan  · Check your blood sugar level before and after exercise  Healthcare providers may tell you to change the amount of insulin you take or food you eat  If your blood sugar level is high, check your blood or urine for ketones before you exercise  Do not exercise if your blood sugar level is high and you have ketones  · If your blood sugar level is less than 100 mg/dL, have a carbohydrate snack before you exercise  Examples are 4 to 6 crackers, ½ banana, 8 ounces (1 cup) of milk, or 4 ounces (½ cup) of juice   Drink water or liquids that do not contain sugar before, during, and after exercise  Ask your dietitian or healthcare provider which liquids you should drink when you exercise  · Do not sit for longer than 30 minutes  If you cannot walk around, at least stand up  This will help you stay active and keep your blood circulating  Do not smoke:  Nicotine and other chemicals in cigarettes and cigars can cause lung damage and make it more difficult to manage your diabetes  Ask your healthcare provider for information if you currently smoke and need help to quit  Do not use e-cigarettes or smokeless tobacco in place of cigarettes or to help you quit  They still contain nicotine  Check your blood pressure as directed:  Ask your healthcare provider what your blood pressure should be  Most adults with diabetes and high blood pressure should have a systolic blood pressure (first number) less than 140  Your diastolic blood pressure (second number) should be less than 90  Wear medical alert identification:  Wear medical alert jewelry or carry a card that says you have diabetes  Ask your healthcare provider where to get these items  Ask about vaccines: You have a higher risk for serious illness if you get the flu, pneumonia, or hepatitis  Ask your healthcare provider if you should get a flu, pneumonia, or hepatitis B vaccine, and when to get the vaccine  Follow up with your healthcare provider as directed: You may need to return to have your A1c checked every 3 months  You will need to return at least once each year to have your feet checked  You will need an eye exam once a year to check for retinopathy  You will also need urine tests every year to check for kidney problems  You may need tests to monitor for heart disease such as an EKG, stress test, blood pressure monitoring, and blood tests  Write down your questions so you remember to ask them during your visits     © 2017 [employer] Information is for End User's use only and may not be sold, redistributed or otherwise used for commercial purposes  All illustrations and images included in CareNotes® are the copyrighted property of [employer]  or Vince Monsalve  The above information is an  only  It is not intended as medical advice for individual conditions or treatments  Talk to your doctor, nurse or pharmacist before following any medical regimen to see if it is safe and effective for you  denies

## 2021-12-29 NOTE — H&P PST ADULT - BREASTS
Ilumya Pregnancy And Lactation Text: The risk during pregnancy and breastfeeding is uncertain with this medication. No masses; no nipple discharge

## 2022-10-25 NOTE — H&P PST ADULT - GAIT/BALANCE
Use Automated Fraction Number And Cumulative Dosage?: No (Maintain Current Freetext Entry) shuffling gait

## 2023-02-10 NOTE — ASU PATIENT PROFILE, ADULT - NS PRO ABUSE SCREEN AFRAID ANYONE YN
GENERAL CONCERNS  Recent weight loss of more than 10 pounds NO   Recent weight gain of more than 10 pounds NO   Fatigue/tiredness NO     HEART-RELATED ISSUES  Chest Pain NO   Difficulty breathing NO   Fainting/Blacking out NO   Palpitations NO   Swelling ankles/extremities YES   Dizziness NO     STOMACH/INTESTINE PROBLEMS  Abdominal pain NO   Bloating NO   Constipation NO   Diarrhea NO   Food intolerance NO   Dysphagia NO   Indigestion NO   Nausea/vomiting NO   Increased appetite NO   Decreased appetite NO   Heartburn NO   Gas and bloating NO   Change in bladder habits NO   Blood in stools NO     RESPIRATORY PROBLEMS  Cough NO   Snoring YES   Shortness of breath YES     ENDOCRINE PROBLEMS  Heat Intolerance NO   Excessive sweating NO   Hair changes NO   Hot flashes NO   Facial Hair NO   Cold intolerance NO     MENTAL HEALTH PROBLEMS  Anxiety NO   Inability to concentrate NO   Mood changes YES   Nervousness NO   Loss of Interest YES     SKIN PROBLEMS  Acne NO   Skin Rash NO     HEMATOLOGICAL PROBLEMS         Blood Clots NO     GENITAL/URINARY PROBLEMS  Urinary frequency/urgency NO   Slow urine flow NO   Nighttime urination YES     NEUROLOGIC PROBLEMS  Headaches NO   Seizures NO   Weakness/ low energy NO   Depression NO   Insomnia NO   Memory Loss NO     MUSCLE/SKELETAL PROBLEMS  Back pain (upper) YES   Back pain (lower) NO   Joint pain NO   Muscle aches/pain NO     ------------------------------    Body Composition  InBody 570    Intracellular water = 44.1 lbs  Extracellular Water = 25.6 lbs  Dry Lean Mass = 24.7 lbs  Body Fat Mass = 99.9 lbs    Total Body Water = 69.7 lbs  Lean Body Mass = 94.4 lbs  Weight = 194.3 lbs    Skeletal Muscle Mass 53.1 lbs    Percent Body Fat =  51.5 %  Extracellular water/Total Body Water = 0.367  Visceral Fat Level = 20     no

## 2023-08-03 NOTE — ED PROVIDER NOTE - TOBACCO USE
----- Message from Lisbet Alex MD sent at 8/3/2023  1:22 AM EDT -----  Let Mr. Massiel Fraser know their test result was ok. Will discuss at next visit. Thanks.
Results left on tp vm
Never smoker
